# Patient Record
Sex: FEMALE | Race: WHITE | Employment: OTHER | URBAN - METROPOLITAN AREA
[De-identification: names, ages, dates, MRNs, and addresses within clinical notes are randomized per-mention and may not be internally consistent; named-entity substitution may affect disease eponyms.]

---

## 2017-11-07 ENCOUNTER — OP HISTORICAL/CONVERTED ENCOUNTER (OUTPATIENT)
Dept: OTHER | Age: 82
End: 2017-11-07

## 2020-06-14 ENCOUNTER — HOSPITAL ENCOUNTER (INPATIENT)
Age: 85
LOS: 2 days | Discharge: HOME | DRG: 244 | End: 2020-06-16
Attending: EMERGENCY MEDICINE | Admitting: INTERNAL MEDICINE
Payer: MEDICARE

## 2020-06-14 DIAGNOSIS — I44.2 COMPLETE HEART BLOCK (HCC): Primary | ICD-10-CM

## 2020-06-14 DIAGNOSIS — R00.1 SYMPTOMATIC BRADYCARDIA: ICD-10-CM

## 2020-06-14 LAB
ALBUMIN SERPL-MCNC: 3.1 G/DL (ref 3.5–5)
ALBUMIN/GLOB SERPL: 0.6 {RATIO} (ref 1.1–2.2)
ALP SERPL-CCNC: 84 U/L (ref 45–117)
ALT SERPL-CCNC: 15 U/L (ref 12–78)
ANION GAP SERPL CALC-SCNC: 7 MMOL/L (ref 5–15)
AST SERPL-CCNC: 16 U/L (ref 15–37)
ATRIAL RATE: 41 BPM
BASOPHILS # BLD: 0.1 K/UL (ref 0–0.1)
BASOPHILS NFR BLD: 1 % (ref 0–1)
BILIRUB SERPL-MCNC: 0.3 MG/DL (ref 0.2–1)
BUN SERPL-MCNC: 19 MG/DL (ref 6–20)
BUN/CREAT SERPL: 21 (ref 12–20)
CALCIUM SERPL-MCNC: 9.1 MG/DL (ref 8.5–10.1)
CALCULATED R AXIS, ECG10: 95 DEGREES
CALCULATED T AXIS, ECG11: 43 DEGREES
CHLORIDE SERPL-SCNC: 102 MMOL/L (ref 97–108)
CO2 SERPL-SCNC: 24 MMOL/L (ref 21–32)
COMMENT, HOLDF: NORMAL
CREAT SERPL-MCNC: 0.89 MG/DL (ref 0.55–1.02)
DIAGNOSIS, 93000: NORMAL
DIFFERENTIAL METHOD BLD: ABNORMAL
EOSINOPHIL # BLD: 0.3 K/UL (ref 0–0.4)
EOSINOPHIL NFR BLD: 3 % (ref 0–7)
ERYTHROCYTE [DISTWIDTH] IN BLOOD BY AUTOMATED COUNT: 13.4 % (ref 11.5–14.5)
GLOBULIN SER CALC-MCNC: 5.1 G/DL (ref 2–4)
GLUCOSE SERPL-MCNC: 169 MG/DL (ref 65–100)
HCT VFR BLD AUTO: 36.5 % (ref 35–47)
HGB BLD-MCNC: 12.1 G/DL (ref 11.5–16)
IMM GRANULOCYTES # BLD AUTO: 0 K/UL (ref 0–0.04)
IMM GRANULOCYTES NFR BLD AUTO: 0 % (ref 0–0.5)
LYMPHOCYTES # BLD: 1.9 K/UL (ref 0.8–3.5)
LYMPHOCYTES NFR BLD: 19 % (ref 12–49)
MAGNESIUM SERPL-MCNC: 1.9 MG/DL (ref 1.6–2.4)
MCH RBC QN AUTO: 32.6 PG (ref 26–34)
MCHC RBC AUTO-ENTMCNC: 33.2 G/DL (ref 30–36.5)
MCV RBC AUTO: 98.4 FL (ref 80–99)
MONOCYTES # BLD: 0.6 K/UL (ref 0–1)
MONOCYTES NFR BLD: 6 % (ref 5–13)
NEUTS SEG # BLD: 6.9 K/UL (ref 1.8–8)
NEUTS SEG NFR BLD: 71 % (ref 32–75)
NRBC # BLD: 0 K/UL (ref 0–0.01)
NRBC BLD-RTO: 0 PER 100 WBC
PLATELET # BLD AUTO: 253 K/UL (ref 150–400)
PMV BLD AUTO: 9.6 FL (ref 8.9–12.9)
POTASSIUM SERPL-SCNC: 4.1 MMOL/L (ref 3.5–5.1)
PROT SERPL-MCNC: 8.2 G/DL (ref 6.4–8.2)
Q-T INTERVAL, ECG07: 484 MS
QRS DURATION, ECG06: 102 MS
QTC CALCULATION (BEZET), ECG08: 394 MS
RBC # BLD AUTO: 3.71 M/UL (ref 3.8–5.2)
SAMPLES BEING HELD,HOLD: NORMAL
SODIUM SERPL-SCNC: 133 MMOL/L (ref 136–145)
TROPONIN I SERPL-MCNC: <0.05 NG/ML
TSH SERPL DL<=0.05 MIU/L-ACNC: 0.55 UIU/ML (ref 0.36–3.74)
VENTRICULAR RATE, ECG03: 40 BPM
WBC # BLD AUTO: 9.7 K/UL (ref 3.6–11)

## 2020-06-14 PROCEDURE — 65620000000 HC RM CCU GENERAL

## 2020-06-14 PROCEDURE — 93005 ELECTROCARDIOGRAM TRACING: CPT

## 2020-06-14 PROCEDURE — 84484 ASSAY OF TROPONIN QUANT: CPT

## 2020-06-14 PROCEDURE — 99284 EMERGENCY DEPT VISIT MOD MDM: CPT

## 2020-06-14 PROCEDURE — 85025 COMPLETE CBC W/AUTO DIFF WBC: CPT

## 2020-06-14 PROCEDURE — 36415 COLL VENOUS BLD VENIPUNCTURE: CPT

## 2020-06-14 PROCEDURE — 74011250637 HC RX REV CODE- 250/637: Performed by: INTERNAL MEDICINE

## 2020-06-14 PROCEDURE — 83735 ASSAY OF MAGNESIUM: CPT

## 2020-06-14 PROCEDURE — 80053 COMPREHEN METABOLIC PANEL: CPT

## 2020-06-14 PROCEDURE — 94760 N-INVAS EAR/PLS OXIMETRY 1: CPT

## 2020-06-14 PROCEDURE — 84443 ASSAY THYROID STIM HORMONE: CPT

## 2020-06-14 RX ORDER — ASPIRIN 81 MG/1
81 TABLET ORAL DAILY
Status: DISCONTINUED | OUTPATIENT
Start: 2020-06-15 | End: 2020-06-16 | Stop reason: HOSPADM

## 2020-06-14 RX ORDER — METFORMIN HYDROCHLORIDE 500 MG/1
500 TABLET ORAL 2 TIMES DAILY WITH MEALS
Status: DISCONTINUED | OUTPATIENT
Start: 2020-06-14 | End: 2020-06-14

## 2020-06-14 RX ORDER — FLUTICASONE PROPIONATE 50 MCG
2 SPRAY, SUSPENSION (ML) NASAL
COMMUNITY

## 2020-06-14 RX ORDER — CETIRIZINE HCL 10 MG
10 TABLET ORAL
COMMUNITY

## 2020-06-14 RX ORDER — NALOXONE HYDROCHLORIDE 0.4 MG/ML
0.4 INJECTION, SOLUTION INTRAMUSCULAR; INTRAVENOUS; SUBCUTANEOUS AS NEEDED
Status: DISCONTINUED | OUTPATIENT
Start: 2020-06-14 | End: 2020-06-16 | Stop reason: HOSPADM

## 2020-06-14 RX ORDER — ACETAMINOPHEN 325 MG/1
650 TABLET ORAL
Status: DISCONTINUED | OUTPATIENT
Start: 2020-06-14 | End: 2020-06-16 | Stop reason: HOSPADM

## 2020-06-14 RX ORDER — ALLOPURINOL 100 MG/1
300 TABLET ORAL DAILY
Status: DISCONTINUED | OUTPATIENT
Start: 2020-06-15 | End: 2020-06-16 | Stop reason: HOSPADM

## 2020-06-14 RX ORDER — FLUTICASONE PROPIONATE 50 MCG
2 SPRAY, SUSPENSION (ML) NASAL
Status: DISCONTINUED | OUTPATIENT
Start: 2020-06-14 | End: 2020-06-16 | Stop reason: HOSPADM

## 2020-06-14 RX ORDER — CETIRIZINE HCL 10 MG
10 TABLET ORAL DAILY
Status: DISCONTINUED | OUTPATIENT
Start: 2020-06-15 | End: 2020-06-16 | Stop reason: HOSPADM

## 2020-06-14 RX ORDER — HYDROCHLOROTHIAZIDE 25 MG/1
12.5 TABLET ORAL DAILY
Status: DISCONTINUED | OUTPATIENT
Start: 2020-06-15 | End: 2020-06-16 | Stop reason: HOSPADM

## 2020-06-14 RX ORDER — LOSARTAN POTASSIUM 50 MG/1
100 TABLET ORAL DAILY
Status: DISCONTINUED | OUTPATIENT
Start: 2020-06-15 | End: 2020-06-16 | Stop reason: HOSPADM

## 2020-06-14 RX ORDER — SODIUM CHLORIDE 0.9 % (FLUSH) 0.9 %
5-40 SYRINGE (ML) INJECTION EVERY 8 HOURS
Status: DISCONTINUED | OUTPATIENT
Start: 2020-06-14 | End: 2020-06-16 | Stop reason: HOSPADM

## 2020-06-14 RX ORDER — MONTELUKAST SODIUM 10 MG/1
10 TABLET ORAL
COMMUNITY

## 2020-06-14 RX ORDER — AMLODIPINE BESYLATE 5 MG/1
10 TABLET ORAL DAILY
Status: DISCONTINUED | OUTPATIENT
Start: 2020-06-15 | End: 2020-06-16 | Stop reason: HOSPADM

## 2020-06-14 RX ORDER — METFORMIN HYDROCHLORIDE 500 MG/1
500 TABLET ORAL
Status: DISCONTINUED | OUTPATIENT
Start: 2020-06-15 | End: 2020-06-16 | Stop reason: HOSPADM

## 2020-06-14 RX ORDER — SODIUM CHLORIDE 0.9 % (FLUSH) 0.9 %
5-40 SYRINGE (ML) INJECTION AS NEEDED
Status: DISCONTINUED | OUTPATIENT
Start: 2020-06-14 | End: 2020-06-16 | Stop reason: HOSPADM

## 2020-06-14 RX ORDER — MONTELUKAST SODIUM 10 MG/1
10 TABLET ORAL
Status: DISCONTINUED | OUTPATIENT
Start: 2020-06-14 | End: 2020-06-16 | Stop reason: HOSPADM

## 2020-06-14 RX ORDER — DOCUSATE SODIUM 100 MG/1
100 CAPSULE, LIQUID FILLED ORAL 2 TIMES DAILY
Status: DISCONTINUED | OUTPATIENT
Start: 2020-06-14 | End: 2020-06-16 | Stop reason: HOSPADM

## 2020-06-14 RX ADMIN — MONTELUKAST SODIUM 10 MG: 10 TABLET, FILM COATED ORAL at 21:43

## 2020-06-14 RX ADMIN — Medication 10 ML: at 21:30

## 2020-06-14 NOTE — PROGRESS NOTES
1520 TRANSFER - IN REPORT:Verbal report received from Fanta(name) on [place]  being received from ED(unit) for routine progression of care  Report consisted of patients Situation, Background, Assessment and Recommendations(SBAR). Information from the following report(s) SBAR, Intake/Output, MAR and Cardiac Rhythm 3rd Degree HB with Junctional escape beats was reviewed with the receiving nurse. Opportunity for questions and clarification was provided. Assessment completed upon patients arrival to unit and care assumed. 1536 Pt arrived to CCU. Primary Nurse Abelina Claude, RN and Hamlet Burciaga RN performed a dual skin assessment on this patient No impairment noted Jam score is 18 Patient resting on [place] In touch bed.    1930 Bedside shift change report given to Kalen (oncoming nurse) by Gwen Peterson (offgoing nurse). Report included the following information SBAR, Intake/Output, MAR, Recent Results and Cardiac Rhythm Complete heart block.

## 2020-06-14 NOTE — ROUTINE PROCESS
1930: Bedside shift change report given to Kalen RN (oncoming nurse) by Denis Lamb RN (offgoing nurse). Report included the following information SBAR, Kardex, ED Summary, Intake/Output, MAR, Recent Results and Cardiac Rhythm SB c CHB. 2030: Dr Santos Factor called CCU and verified with RN plan for PPM insertion tomorrow morning and for pt to remain NPO after midnight. 2100: RN at the bedside to get pt off bedpan and provide full CHG bath. 0730:

## 2020-06-14 NOTE — Clinical Note
Dressed using non-adherent, topical skin adhesive and transparent dressing. Site: clean, dry, & intact, no bleeding and no hematoma.

## 2020-06-14 NOTE — Clinical Note
TRANSFER - OUT REPORT:     Verbal report given to: Jacklyn. Report consisted of patient's Situation, Background, Assessment and   Recommendations(SBAR). Opportunity for questions and clarification was provided. Patient transported with a Registered Nurse and [address] [place] [address] / [facility] [employer].

## 2020-06-14 NOTE — ED TRIAGE NOTES
Patient arrives complaining of generalized weakness and dizziness starting when she woke up this morning. HR 38-40 in triage, reports taking metoprolol this morning, but denies taking afternoon dose.

## 2020-06-14 NOTE — PROGRESS NOTES
Admission Medication Reconciliation:    Information obtained from:  Patient via secure line  RxQuery data available¹:  NO    Comments/Recommendations: Updated PTA meds/reviewed patient's allergies. 1)  Patient is a good historian who was able to provide medications, regimens and last doses. She reports recent nagging cough which her pulmonologist placed her on cetirizine, montelukast, and PRN Flonase    2)  Medication changes (since last review): Added  - Cetirizine  - Montelukast  - Flonase PRN    Adjusted  - Metformin 500 mg daily with Breakfast  - Metoprolol tartrate 50 mg TID (vs. 75 mg Twice daily)    Removed  - Doxazosin 1 mg QHS    3)  No new undocumented allergies. ¹RxQuery pharmacy benefit data reflects medications filled and processed through the patient's insurance, however   this data does NOT capture whether the medication was picked up or is currently being taken by the patient. Allergies:  Rocephin [ceftriaxone] and Sulfa (sulfonamide antibiotics)    Significant PMH/Disease States:   Past Medical History:   Diagnosis Date    Cancer of breast ([facility]     Goiter     HTN (hypertension)      Chief Complaint for this Admission:    Chief Complaint   Patient presents with    Fatigue    Dizziness     Prior to Admission Medications:   Prior to Admission Medications   Prescriptions Last Dose Informant Taking? CALCIUM CARBONATE/VITAMIN D3 (CALTRATE 600 + D PO) 2020 at AM  Yes   Sig: Take  by mouth two (2) times a day. allopurinol (ZYLOPRIM) 300 mg tablet 2020 at AM  Yes   Sig: Take  by mouth daily. amlodipine (NORVASC) 10 mg tablet 2020 at AM  Yes   Sig: Take  by mouth daily. aspirin 81 mg tablet 2020 at AM  Yes   Sig: Take 81 mg by mouth. cetirizine (ZyrTEC) 10 mg tablet 2020 at AM  Yes   Sig: Take 10 mg by mouth.    fluticasone propionate (Flonase Allergy Relief) 50 mcg/actuation nasal spray 2020 at AM  Yes   Si Sprays by Both Nostrils route two (2) times daily as needed for Rhinitis. metformin (GLUCOPHAGE) 500 mg tablet 6/14/2020 at AM  Yes   Sig: Take  by mouth daily. metoprolol (LOPRESSOR) 50 mg tablet 6/14/2020 at AM  Yes   Sig: Take 50 mg by mouth three (3) times daily (with meals). 1 and 1/2 tabs    montelukast (Singulair) 10 mg tablet 6/13/2020 at HS  Yes   Sig: Take 10 mg by mouth nightly. olmesartan-hydrochlorothiazide (BENICAR HCT) 40-12.5 mg per tablet 6/14/2020 at AM  Yes   Sig: Take 1 Tab by mouth daily. omeprazole (PRILOSEC) 20 mg capsule 6/14/2020 at AM  Yes   Sig: Take 20 mg by mouth daily. Facility-Administered Medications: None       Please contact the main inpatient pharmacy with any questions or concerns at [phone] and we will direct you to the clinical pharmacist covering this patient's care while in-house.    Nadia Mercer, PHARMD

## 2020-06-14 NOTE — ED PROVIDER NOTES
Fatigue   This is a new problem. The current episode started 6 to 12 hours ago. The problem has not changed since onset. There was no focality noted. Pertinent negatives include no focal weakness, no slurred speech, no memory loss, no mental status change and no disorientation. There has been no fever. Pertinent negatives include no shortness of breath, no chest pain, no vomiting, no altered mental status and no confusion. There were no medications administered prior to arrival. Associated medical issues do not include CVA. Dizziness   This is a new problem. The current episode started 6 to 12 hours ago. The problem has not changed since onset. There was no focality noted. Pertinent negatives include no focal weakness, no slurred speech, no memory loss, no mental status change and no disorientation. There has been no fever. Pertinent negatives include no shortness of breath, no chest pain, no vomiting, no altered mental status and no confusion. Associated medical issues do not include CVA. Past Medical History:   Diagnosis Date    Cancer [facility] breast ([facility] [address]     Goiter     HTN (hypertension)        Past Surgical History:   Procedure Laterality Date    ENLARGE BREAST WITH IMPLANT      HX HYSTERECTOMY      MASTECTOMY FORM  1977         Family History:   Problem Relation Age of Onset    Cancer Father     Cancer Brother        Social History     Socioeconomic History    Marital status:      Spouse name: Not on file    Number of children: Not on file    Years of education: Not on file    Highest education level: Not on file   Occupational History    Not on file   Social Needs    Financial resource strain: Not on file    Food insecurity     Worry: Not on file     Inability: Not on file    Transportation needs     Medical: Not on file     Non-medical: Not on file   Tobacco Use    Smoking status: Never Smoker   Substance and Sexual Activity    Alcohol use:  Yes     Alcohol/week: 0.0 standard drinks     Types: 1 - 2 Glasses of wine per week     Comment: daily    Drug use: No    Sexual activity: Not on file   Lifestyle    Physical activity     Days per week: Not on file     Minutes per session: Not on file    Stress: Not on file   Relationships    Social connections     Talks on phone: Not on file     Gets together: Not on file     Attends Gnosticist service: Not on file     Active member of club or organization: Not on file     Attends meetings of clubs or organizations: Not on file     Relationship status: Not on file    Intimate partner violence     Fear of current or ex partner: Not on file     Emotionally abused: Not on file     Physically abused: Not on file     Forced sexual activity: Not on file   Other Topics Concern    Not on file   Social History Narrative    Not on file         ALLERGIES: Rocephin [ceftriaxone] and Sulfa (sulfonamide antibiotics)    Review of Systems   Constitutional: Positive for fatigue. Respiratory: Negative for shortness of breath. Cardiovascular: Negative for chest pain. Gastrointestinal: Negative for vomiting. Neurological: Positive for dizziness. Negative for focal weakness. Psychiatric/Behavioral: Negative for confusion and memory loss. All other systems reviewed and are negative. Vitals:    06/14/20 1348   BP: 168/57   Pulse: (!) 40   Resp: 18   Temp: 97.7 °F (36.5 °C)   SpO2: 96%   Height: 5' 5\" (1.651 m)            Physical Exam  Vitals signs and nursing note reviewed. Constitutional:       General: She is not in acute distress. Appearance: She is well-developed. HENT:      Head: Normocephalic and atraumatic. Eyes:      Conjunctiva/sclera: Conjunctivae normal.   Neck:      Musculoskeletal: Neck supple. Cardiovascular:      Rate and Rhythm: Regular rhythm. Bradycardia present. Heart sounds: Normal heart sounds. Pulmonary:      Effort: Pulmonary effort is normal. No respiratory distress.       Breath sounds: Normal breath sounds. Abdominal:      General: There is no distension. Musculoskeletal: Normal range of motion. General: No deformity. Skin:     General: Skin is warm and dry. Neurological:      Mental Status: She is alert. Cranial Nerves: No cranial nerve deficit. Psychiatric:         Behavior: Behavior normal.          MDM     80 y.o. female presents with severe onset fatigue and malaise that started this morning. She has been otherwise normal leading up to today. She arrives in complete heart block with junctional escape which is symptomatic but no signs of hemodynamic compromise. Discussed with cardiology who is coming to admit patient and monitor with plan for urgent pacemaker. Procedures    Critical Care Time: 31 minutes  I personally performed critical care time separate of billable procedures which may include ordering and interpretation of testing, ordering of medications, direct patient assessment and reassessment, discussion with consultants or family, and documentation. EKG 1355: Rate 40, 3rd degree heart block with junctional escape. No ST segment or T wave abnormalities. No previous tracings available for review.

## 2020-06-14 NOTE — H&P
CARDIOLOGY ADMIT                 Subjective:    Date of  Admission: 6/14/2020  1:54 PM     Admission type:Emergency    Halina Hurst is a 80 y.o. female admitted for Heart block AV complete ([facility] [I44.2]. She awoke this AM and knew something was off. She has been on stable meds for some time and has only had a recent issue with a nagging cough which is being evaluated by pulmonary. She felt poorly and weak. There were no syncopal episodes or near-syncopal episodes. Upon arriving, she was noted to be markedly bradycardic. EKG showed that she was in complete heart block.     Patient Active Problem List    Diagnosis Date Noted    Heart block AV complete ([facility] [address] 06/14/2020    Thyroid nodule 08/19/2011    HTN (hypertension)     Pamela Hayes MD  Past Medical History:   Diagnosis Date    Cancer of breast ([facility]     Goiter     HTN (hypertension)       Past Surgical History:   Procedure Laterality Date    ENLARGE BREAST WITH IMPLANT      HX HYSTERECTOMY      MASTECTOMY FORM  1977     Allergies   Allergen Reactions    Rocephin [Ceftriaxone] Other (comments)     Passed out    Sulfa (Sulfonamide Antibiotics) Swelling     Throat closing      Family History   Problem Relation Age of Onset    Cancer Father     Cancer Brother       Current Facility-Administered Medications   Medication Dose Route Frequency    sodium chloride (NS) flush 5-40 mL  5-40 mL IntraVENous Q8H    sodium chloride (NS) flush 5-40 mL  5-40 mL IntraVENous PRN    acetaminophen (TYLENOL) tablet 650 mg  650 mg Oral Q4H PRN    naloxone (NARCAN) injection 0.4 mg  0.4 mg IntraVENous PRN    docusate sodium (COLACE) capsule 100 mg  100 mg Oral BID    [START ON 6/15/2020] allopurinoL (ZYLOPRIM) tablet 300 mg  300 mg Oral DAILY    [START ON 6/15/2020] amLODIPine (NORVASC) tablet 10 mg  10 mg Oral DAILY    [START ON 6/15/2020] aspirin tablet 81 mg  81 mg Oral DAILY    metFORMIN (GLUCOPHAGE) tablet 500 mg  500 mg Oral BID WITH MEALS    [facility] TO SUBSTITUTE PER PROTOCOL (Reordered from: olmesartan-hydrochlorothiazide (BENICAR HCT) 40-12.5 mg per tablet)    Per Protocol     Current Outpatient Medications   Medication Sig    metoprolol (LOPRESSOR) 50 mg tablet Take  by mouth two (2) times a day. 1 and 1/2 tabs     CALCIUM CARBONATE/VITAMIN D3 (CALTRATE 600 + D PO) Take  by mouth two (2) times a day.  metformin (GLUCOPHAGE) 500 mg tablet Take  by mouth daily.  aspirin 81 mg tablet Take 81 mg by mouth.  doxazosin (CARDURA) 1 mg tablet Take  by mouth nightly.  omeprazole (PRILOSEC) 20 mg capsule Take 20 mg by mouth daily.  allopurinol (ZYLOPRIM) 300 mg tablet Take  by mouth daily.  olmesartan-hydrochlorothiazide (BENICAR HCT) 40-12.5 mg per tablet Take 1 Tab by mouth daily.  amlodipine (NORVASC) 10 mg tablet Take  by mouth daily. Review of Symptoms:  Gen - no F/C/S  Eyes - no vision changes  ENT - no sore throat, rhinorrhea, otalgia  CV - no CP, no palpitations, no orthopnea, no PND, no TOYA  Resp no cough, no SOB/CASTELLANO  GI - no AP, no n/v/d/c   - no dysuria, no hematuria  MSK - no abnormal joint pains  Skin - no rashes  Neuro - no HA, no numbness, no weakness, no slurred speech  Psych - no change in mood         Physical Exam    Visit Vitals  /57 (BP 1 Location: Right arm, BP Patient Position: At rest)   Pulse (!) 40   Temp 97.7 °F (36.5 °C)   Resp 18   Ht 5' 5\" (1.651 m)   Wt 79.1 kg (174 lb 6.4 oz)   SpO2 96%   BMI 29.02 kg/m²     NAD  Skin warm and dry  Nl conjunctiva  Oropharynx without exudate.     Neck supple  Lungs clear  Bradycardic, regular  Abdomen soft and non tender  Pulses 2+ radials  No TOYA  Neuro:  Grossly intact  Appropriate      Cardiographics    Telemetry: Heart block  ECG: heart block , no other ischemic changes  Echocardiogram: pending    Labs:   Recent Results (from the past 24 hour(s))   EKG, 12 LEAD, INITIAL    Collection Time: 06/14/20  1:55 PM   Result Value Ref Range    Ventricular Rate 40 BPM    Atrial Rate 41 BPM    QRS Duration 102 ms    Q-T Interval 484 ms    QTC Calculation (Bezet) 394 ms    Calculated R Axis 95 degrees    Calculated T Axis 43 degrees    Diagnosis       Marked sinus bradycardia with AV dissociation and Junctional bradycardia with   sinus/atrial capture  Septal infarct , age undetermined  ST & T wave abnormality, consider anterior ischemia  No previous ECGs available     CBC WITH AUTOMATED DIFF    Collection Time: 06/14/20  1:58 PM   Result Value Ref Range    WBC 9.7 3.6 - 11.0 K/uL    RBC 3.71 (L) 3.80 - 5.20 M/uL    HGB 12.1 11.5 - 16.0 g/dL    HCT 36.5 35.0 - 47.0 %    MCV 98.4 80.0 - 99.0 FL    MCH 32.6 26.0 - 34.0 PG    MCHC 33.2 30.0 - 36.5 g/dL    RDW 13.4 11.5 - 14.5 %    PLATELET 557 011 - 131 K/uL    MPV 9.6 8.9 - 12.9 FL    NRBC 0.0 0  WBC    ABSOLUTE NRBC 0.00 0.00 - 0.01 K/uL    NEUTROPHILS 71 32 - 75 %    LYMPHOCYTES 19 12 - 49 %    MONOCYTES 6 5 - 13 %    EOSINOPHILS 3 0 - 7 %    BASOPHILS 1 0 - 1 %    IMMATURE GRANULOCYTES 0 0.0 - 0.5 %    ABS. NEUTROPHILS 6.9 1.8 - 8.0 K/UL    ABS. LYMPHOCYTES 1.9 0.8 - 3.5 K/UL    ABS. MONOCYTES 0.6 0.0 - 1.0 K/UL    ABS. EOSINOPHILS 0.3 0.0 - 0.4 K/UL    ABS. BASOPHILS 0.1 0.0 - 0.1 K/UL    ABS. IMM. GRANS. 0.0 0.00 - 0.04 K/UL    DF AUTOMATED     SAMPLES BEING HELD    Collection Time: 06/14/20  1:58 PM   Result Value Ref Range    SAMPLES BEING HELD [id number]     COMMENT        Add-on orders for these samples will be processed based on acceptable specimen integrity and analyte stability, which may vary by analyte. Assessment and Plan: This is an 80 yof with HTN here with complete heart block. Given that she is hemodynamically stable, she does not have an emergent indication for a PPM or a temporary pacemaker at this time.  However, unless holding her metoprolol is sufficient, she will need a PPM placed tomorrow     Will admit to the ICU for monitoring in event that she deteriorates and needs a temporary device or drip  Holding BB  Holding cardura   Cont other cardiac meds  NPO at MN for PPM placement    Please call with questions    Critical care time 15-49 minutes     Assessment:

## 2020-06-14 NOTE — ROUTINE PROCESS
TRANSFER - OUT REPORT: 
 
Verbal report given to [facility] RN(name) on [place]  being transferred to CCU(unit) for routine progression of care Report consisted of patients Situation, Background, Assessment and  
Recommendations(SBAR). Information from the following report(s) SBAR, Kardex, Recent Results and Cardiac Rhythm 3rd Degree AV Block was reviewed with the receiving nurse. Lines:  
Peripheral IV 06/14/20 Right Wrist (Active) Site Assessment Clean, dry, & intact 6/14/2020  2:15 PM  
Phlebitis Assessment 0 6/14/2020  2:15 PM  
Infiltration Assessment 0 6/14/2020  2:15 PM  
Dressing Status Clean, dry, & intact 6/14/2020  2:15 PM  
  
 
Opportunity for questions and clarification was provided. Patient transported with: 
 Monitor Registered Nurse

## 2020-06-15 ENCOUNTER — APPOINTMENT (OUTPATIENT)
Dept: GENERAL RADIOLOGY | Age: 85
DRG: 244 | End: 2020-06-15
Attending: INTERNAL MEDICINE
Payer: MEDICARE

## 2020-06-15 ENCOUNTER — APPOINTMENT (OUTPATIENT)
Dept: NON INVASIVE DIAGNOSTICS | Age: 85
DRG: 244 | End: 2020-06-15
Attending: INTERNAL MEDICINE
Payer: MEDICARE

## 2020-06-15 LAB
ANION GAP SERPL CALC-SCNC: 7 MMOL/L (ref 5–15)
ATRIAL RATE: 38 BPM
BUN SERPL-MCNC: 16 MG/DL (ref 6–20)
BUN/CREAT SERPL: 20 (ref 12–20)
CALCIUM SERPL-MCNC: 8.9 MG/DL (ref 8.5–10.1)
CALCULATED R AXIS, ECG10: 89 DEGREES
CALCULATED T AXIS, ECG11: 47 DEGREES
CHLORIDE SERPL-SCNC: 102 MMOL/L (ref 97–108)
CO2 SERPL-SCNC: 27 MMOL/L (ref 21–32)
CREAT SERPL-MCNC: 0.79 MG/DL (ref 0.55–1.02)
DIAGNOSIS, 93000: NORMAL
ECHO AO ROOT DIAM: 3.15 CM
ECHO LA MAJOR AXIS: 3.27 CM
ECHO LA TO AORTIC ROOT RATIO: 1.04
ECHO LV INTERNAL DIMENSION DIASTOLIC: 3.86 CM (ref 3.9–5.3)
ECHO LV INTERNAL DIMENSION SYSTOLIC: 2.34 CM
ECHO LV IVSD: 1.19 CM (ref 0.6–0.9)
ECHO LV MASS 2D: 155.1 G (ref 67–162)
ECHO LV MASS INDEX 2D: 90.3 G/M2 (ref 43–95)
ECHO LV POSTERIOR WALL DIASTOLIC: 0.98 CM (ref 0.6–0.9)
ECHO PV MAX VELOCITY: 88.29 CM/S
ECHO PV PEAK GRADIENT: 3.1 MMHG
ECHO TV REGURGITANT MAX VELOCITY: 334.58 CM/S
ECHO TV REGURGITANT PEAK GRADIENT: 44.8 MMHG
GLUCOSE SERPL-MCNC: 96 MG/DL (ref 65–100)
LVFS 2D: 39.36 %
POTASSIUM SERPL-SCNC: 3.8 MMOL/L (ref 3.5–5.1)
Q-T INTERVAL, ECG07: 502 MS
QRS DURATION, ECG06: 110 MS
QTC CALCULATION (BEZET), ECG08: 394 MS
SODIUM SERPL-SCNC: 136 MMOL/L (ref 136–145)
VENTRICULAR RATE, ECG03: 37 BPM

## 2020-06-15 PROCEDURE — C1898 LEAD, PMKR, OTHER THAN TRANS: HCPCS | Performed by: INTERNAL MEDICINE

## 2020-06-15 PROCEDURE — 99152 MOD SED SAME PHYS/QHP 5/>YRS: CPT | Performed by: INTERNAL MEDICINE

## 2020-06-15 PROCEDURE — 74011000250 HC RX REV CODE- 250: Performed by: INTERNAL MEDICINE

## 2020-06-15 PROCEDURE — 36415 COLL VENOUS BLD VENIPUNCTURE: CPT

## 2020-06-15 PROCEDURE — 65620000000 HC RM CCU GENERAL

## 2020-06-15 PROCEDURE — 74011250637 HC RX REV CODE- 250/637: Performed by: INTERNAL MEDICINE

## 2020-06-15 PROCEDURE — 02HK3JZ INSERTION OF PACEMAKER LEAD INTO RIGHT VENTRICLE, PERCUTANEOUS APPROACH: ICD-10-PCS | Performed by: INTERNAL MEDICINE

## 2020-06-15 PROCEDURE — C1893 INTRO/SHEATH, FIXED,NON-PEEL: HCPCS | Performed by: INTERNAL MEDICINE

## 2020-06-15 PROCEDURE — 77030039046 HC PAD DEFIB RADIOTRNSPNT CNMD -B: Performed by: INTERNAL MEDICINE

## 2020-06-15 PROCEDURE — 74011250636 HC RX REV CODE- 250/636: Performed by: INTERNAL MEDICINE

## 2020-06-15 PROCEDURE — 93306 TTE W/DOPPLER COMPLETE: CPT

## 2020-06-15 PROCEDURE — 99153 MOD SED SAME PHYS/QHP EA: CPT | Performed by: INTERNAL MEDICINE

## 2020-06-15 PROCEDURE — 77030012935 HC DRSG AQUACEL BMS -B: Performed by: INTERNAL MEDICINE

## 2020-06-15 PROCEDURE — 77030038269 HC DRN EXT URIN PURWCK BARD -A

## 2020-06-15 PROCEDURE — 77030022704 HC SUT VLOC COVD -B: Performed by: INTERNAL MEDICINE

## 2020-06-15 PROCEDURE — C1892 INTRO/SHEATH,FIXED,PEEL-AWAY: HCPCS | Performed by: INTERNAL MEDICINE

## 2020-06-15 PROCEDURE — 71045 X-RAY EXAM CHEST 1 VIEW: CPT

## 2020-06-15 PROCEDURE — C1769 GUIDE WIRE: HCPCS | Performed by: INTERNAL MEDICINE

## 2020-06-15 PROCEDURE — C1785 PMKR, DUAL, RATE-RESP: HCPCS | Performed by: INTERNAL MEDICINE

## 2020-06-15 PROCEDURE — 77030002996 HC SUT SLK J&J -A: Performed by: INTERNAL MEDICINE

## 2020-06-15 PROCEDURE — A4565 SLINGS: HCPCS | Performed by: INTERNAL MEDICINE

## 2020-06-15 PROCEDURE — 33208 INSRT HEART PM ATRIAL & VENT: CPT | Performed by: INTERNAL MEDICINE

## 2020-06-15 PROCEDURE — 74011636320 HC RX REV CODE- 636/320: Performed by: INTERNAL MEDICINE

## 2020-06-15 PROCEDURE — 02H63JZ INSERTION OF PACEMAKER LEAD INTO RIGHT ATRIUM, PERCUTANEOUS APPROACH: ICD-10-PCS | Performed by: INTERNAL MEDICINE

## 2020-06-15 PROCEDURE — C1887 CATHETER, GUIDING: HCPCS | Performed by: INTERNAL MEDICINE

## 2020-06-15 PROCEDURE — 80048 BASIC METABOLIC PNL TOTAL CA: CPT

## 2020-06-15 PROCEDURE — 0JH606Z INSERTION OF PACEMAKER, DUAL CHAMBER INTO CHEST SUBCUTANEOUS TISSUE AND FASCIA, OPEN APPROACH: ICD-10-PCS | Performed by: INTERNAL MEDICINE

## 2020-06-15 PROCEDURE — 77030010507 HC ADH SKN DERMBND J&J -B: Performed by: INTERNAL MEDICINE

## 2020-06-15 PROCEDURE — 77030018673: Performed by: INTERNAL MEDICINE

## 2020-06-15 DEVICE — LEAD PCMKR CAPSUR SP NOVUS 58 --: Type: IMPLANTABLE DEVICE | Status: FUNCTIONAL

## 2020-06-15 DEVICE — LEAD PACE 6FR L53CM PLAT ALLOY/POROUS TI NITRIDE PERM R ATR: Type: IMPLANTABLE DEVICE | Status: FUNCTIONAL

## 2020-06-15 DEVICE — IPG W1DR01 AZURE XT DR MRI WL USA BCP
Type: IMPLANTABLE DEVICE | Status: FUNCTIONAL
Brand: AZURE™ XT DR MRI SURESCAN™

## 2020-06-15 RX ORDER — HYDROCODONE BITARTRATE AND ACETAMINOPHEN 5; 325 MG/1; MG/1
1 TABLET ORAL
Status: DISCONTINUED | OUTPATIENT
Start: 2020-06-15 | End: 2020-06-16 | Stop reason: HOSPADM

## 2020-06-15 RX ORDER — MIDAZOLAM HYDROCHLORIDE 1 MG/ML
INJECTION, SOLUTION INTRAMUSCULAR; INTRAVENOUS AS NEEDED
Status: DISCONTINUED | OUTPATIENT
Start: 2020-06-15 | End: 2020-06-15 | Stop reason: HOSPADM

## 2020-06-15 RX ORDER — FENTANYL CITRATE 50 UG/ML
INJECTION, SOLUTION INTRAMUSCULAR; INTRAVENOUS AS NEEDED
Status: DISCONTINUED | OUTPATIENT
Start: 2020-06-15 | End: 2020-06-15 | Stop reason: HOSPADM

## 2020-06-15 RX ORDER — SODIUM CHLORIDE 0.9 % (FLUSH) 0.9 %
5-40 SYRINGE (ML) INJECTION EVERY 8 HOURS
Status: DISCONTINUED | OUTPATIENT
Start: 2020-06-15 | End: 2020-06-16 | Stop reason: HOSPADM

## 2020-06-15 RX ORDER — SODIUM CHLORIDE 0.9 % (FLUSH) 0.9 %
5-40 SYRINGE (ML) INJECTION AS NEEDED
Status: DISCONTINUED | OUTPATIENT
Start: 2020-06-15 | End: 2020-06-16 | Stop reason: HOSPADM

## 2020-06-15 RX ORDER — LIDOCAINE HYDROCHLORIDE 10 MG/ML
INJECTION INFILTRATION; PERINEURAL AS NEEDED
Status: DISCONTINUED | OUTPATIENT
Start: 2020-06-15 | End: 2020-06-15 | Stop reason: HOSPADM

## 2020-06-15 RX ORDER — CEFAZOLIN SODIUM/WATER 2 G/20 ML
SYRINGE (ML) INTRAVENOUS AS NEEDED
Status: DISCONTINUED | OUTPATIENT
Start: 2020-06-15 | End: 2020-06-15 | Stop reason: HOSPADM

## 2020-06-15 RX ADMIN — ACETAMINOPHEN 650 MG: 325 TABLET ORAL at 10:47

## 2020-06-15 RX ADMIN — CETIRIZINE HYDROCHLORIDE 10 MG: 10 TABLET, FILM COATED ORAL at 10:34

## 2020-06-15 RX ADMIN — ACETAMINOPHEN 650 MG: 325 TABLET ORAL at 21:04

## 2020-06-15 RX ADMIN — AMLODIPINE BESYLATE 10 MG: 5 TABLET ORAL at 10:34

## 2020-06-15 RX ADMIN — Medication 10 ML: at 06:31

## 2020-06-15 RX ADMIN — ACETAMINOPHEN 650 MG: 325 TABLET ORAL at 17:27

## 2020-06-15 RX ADMIN — LOSARTAN POTASSIUM 100 MG: 50 TABLET, FILM COATED ORAL at 10:33

## 2020-06-15 RX ADMIN — Medication 10 ML: at 21:03

## 2020-06-15 RX ADMIN — MONTELUKAST SODIUM 10 MG: 10 TABLET, FILM COATED ORAL at 21:03

## 2020-06-15 RX ADMIN — ALLOPURINOL 300 MG: 100 TABLET ORAL at 10:34

## 2020-06-15 RX ADMIN — HYDROCHLOROTHIAZIDE 12.5 MG: 25 TABLET ORAL at 10:33

## 2020-06-15 RX ADMIN — ASPIRIN 81 MG: 81 TABLET, COATED ORAL at 10:34

## 2020-06-15 NOTE — CONSULTS
ELECTROPHYSIOLOGY CONSULT                 Assessment:     Assessment:       Active Problems:    Heart block AV complete ([address] (6/14/2020)         Plan:    1. CHB:  rec pacemaker  RIsks for for Device implant    I have discussed the risk and benefits of device implantation with the patient and family. The risks of pacemaker implant include:  5/1000 neuro vascular injury such as bleeding   3/1000 CVA, MI , or death  2-4% risk of infection  1% risk of pneumothorax  The lifestyle modifications with a device implant such as avoidance of MRI scanners and areas with high TIGRE were discussed. The patient understood and wished to proceed. 2. Hx Breast cancer left marck  Sp resection and ln  Implant pacemker on right. Subjective:    Date of  Admission: 6/14/2020  1:54 PM     Admission type:Emergency    Alfred Groves is a 80 y.o. female admitted for Heart block AV complete ([facility] [I44.2]. Patient complains of  palpitations, near-syncope, fatigue. This consultation was requested by Dr. Sandra Lang . Patient's symptoms last approximately 2 days, and are gradual onset, still present. She describes the symptoms as occurring at rest, occurring onset during, sleep. The symptoms have been associated with nothing and are worsened by standing, walking . Previous treatment/evaluation includes persantine thallium . Cardiac risk factors: none.     Patient Active Problem List    Diagnosis Date Noted    Heart block AV complete ([address] 06/14/2020    Thyroid nodule 08/19/2011    HTN (hypertension)     Goiter       Daryle Piper Bennye Eis, MD  Past Medical History:   Diagnosis Date    Cancer of breast ([facility] [address]     Goiter     HTN (hypertension)       Past Surgical History:   Procedure Laterality Date    ENLARGE BREAST WITH IMPLANT      HX HYSTERECTOMY      MASTECTOMY FORM  1977     Allergies   Allergen Reactions    Rocephin [Ceftriaxone] Other (comments)     Passed out    Sulfa (Sulfonamide Antibiotics) Anaphylaxis     Throat closing      Family History   Problem Relation Age of Onset    Cancer Father     Cancer Brother       Current Facility-Administered Medications   Medication Dose Route Frequency    sodium chloride (NS) flush 5-40 mL  5-40 mL IntraVENous Q8H    sodium chloride (NS) flush 5-40 mL  5-40 mL IntraVENous PRN    acetaminophen (TYLENOL) tablet 650 mg  650 mg Oral Q4H PRN    naloxone (NARCAN) injection 0.4 mg  0.4 mg IntraVENous PRN    docusate sodium (COLACE) capsule 100 mg  100 mg Oral BID    allopurinoL (ZYLOPRIM) tablet 300 mg  300 mg Oral DAILY    amLODIPine (NORVASC) tablet 10 mg  10 mg Oral DAILY    aspirin delayed-release tablet 81 mg  81 mg Oral DAILY    metFORMIN (GLUCOPHAGE) tablet 500 mg  500 mg Oral DAILY WITH BREAKFAST    losartan (COZAAR) tablet 100 mg  100 mg Oral DAILY    And    hydroCHLOROthiazide (HYDRODIURIL) tablet 12.5 mg  12.5 mg Oral DAILY    cetirizine (ZYRTEC) tablet 10 mg  10 mg Oral DAILY    montelukast (SINGULAIR) tablet 10 mg  10 mg Oral QHS    fluticasone propionate (FLONASE) 50 mcg/actuation nasal spray 2 Spray  2 Spray Both Nostrils BID PRN         Review of Symptoms:  A comprehensive review of systems was negative. No hemoptysis, hematemesis, epistaxis, melena, hematuria. No fevers,  Rashes, seizures, visual disturbances, difficulty walking, no abdominal pain         Physical Exam    Visit Vitals  /86   Pulse (!) 39   Temp 97.9 °F (36.6 °C)   Resp 23   Ht 5' 5\" (1.651 m)   Wt 144 lb 2.9 oz (65.4 kg)   SpO2 95%   Breastfeeding No   BMI 23.99 kg/m²     Skin warm and dry  PERRLA, EOMI  Oropharynx without exudate. Mallampati 2  Neck supple, thyroid not enlarged  Lungs clear  PMI non displaced. Normal S1/ S2   No Mummurs, click or Rubs  No S3 or S4  Abdomen soft and non tender, No Hepatosplenomegaly  Pulses 2+ throughout,   Neuro: , normal facial grimace,  Moves all extremities.    AAAO  unanxious      Cardiographics    Telemetry: normal sinus rhythm  chb  ECG: complete heart block ventr escape rhythm  Labs:   Recent Results (from the past 24 hour(s))   EKG, 12 LEAD, INITIAL    Collection Time: 06/14/20  1:55 PM   Result Value Ref Range    Ventricular Rate 40 BPM    Atrial Rate 41 BPM    QRS Duration 102 ms    Q-T Interval 484 ms    QTC Calculation (Bezet) 394 ms    Calculated R Axis 95 degrees    Calculated T Axis 43 degrees    Diagnosis       sinus rhythm  with third degree av block and idioventricular escaped rhythm  No previous ECGs available  Confirmed by Rowena Martinez MD, [facility] ([id number]) on 6/14/2020 3:40:36 PM     CBC WITH AUTOMATED DIFF    Collection Time: 06/14/20  1:58 PM   Result Value Ref Range    WBC 9.7 3.6 - 11.0 K/uL    RBC 3.71 (L) 3.80 - 5.20 M/uL    HGB 12.1 11.5 - 16.0 g/dL    HCT 36.5 35.0 - 47.0 %    MCV 98.4 80.0 - 99.0 FL    MCH 32.6 26.0 - 34.0 PG    MCHC 33.2 30.0 - 36.5 g/dL    RDW 13.4 11.5 - 14.5 %    PLATELET 480 947 - 896 K/uL    MPV 9.6 8.9 - 12.9 FL    NRBC 0.0 0  WBC    ABSOLUTE NRBC 0.00 0.00 - 0.01 K/uL    NEUTROPHILS 71 32 - 75 %    LYMPHOCYTES 19 12 - 49 %    MONOCYTES 6 5 - 13 %    EOSINOPHILS 3 0 - 7 %    BASOPHILS 1 0 - 1 %    IMMATURE GRANULOCYTES 0 0.0 - 0.5 %    ABS. NEUTROPHILS 6.9 1.8 - 8.0 K/UL    ABS. LYMPHOCYTES 1.9 0.8 - 3.5 K/UL    ABS. MONOCYTES 0.6 0.0 - 1.0 K/UL    ABS. EOSINOPHILS 0.3 0.0 - 0.4 K/UL    ABS. BASOPHILS 0.1 0.0 - 0.1 K/UL    ABS. IMM.  GRANS. 0.0 0.00 - 0.04 K/UL    DF AUTOMATED     METABOLIC PANEL, COMPREHENSIVE    Collection Time: 06/14/20  1:58 PM   Result Value Ref Range    Sodium 133 (L) 136 - 145 mmol/L    Potassium 4.1 3.5 - 5.1 mmol/L    Chloride 102 97 - 108 mmol/L    CO2 24 21 - 32 mmol/L    Anion gap 7 5 - 15 mmol/L    Glucose 169 (H) 65 - 100 mg/dL    BUN 19 6 - 20 MG/DL    Creatinine 0.89 0.55 - 1.02 MG/DL    BUN/Creatinine ratio 21 (H) 12 - 20      GFR est AA >60 >60 ml/min/1.73m2    GFR est non-AA >60 >60 ml/min/1.73m2    Calcium 9.1 8.5 - 10.1 MG/DL    Bilirubin, total 0.3 0.2 - 1.0 MG/DL    ALT (SGPT) 15 12 - 78 U/L    AST (SGOT) 16 15 - 37 U/L    Alk. phosphatase 84 45 - 117 U/L    Protein, total 8.2 6.4 - 8.2 g/dL    Albumin 3.1 (L) 3.5 - 5.0 g/dL    Globulin 5.1 (H) 2.0 - 4.0 g/dL    A-G Ratio 0.6 (L) 1.1 - 2.2     SAMPLES BEING HELD    Collection Time: 06/14/20  1:58 PM   Result Value Ref Range    SAMPLES BEING HELD [id number]     COMMENT        Add-on orders for these samples will be processed based on acceptable specimen integrity and analyte stability, which may vary by analyte.    TSH 3RD GENERATION    Collection Time: 06/14/20  1:58 PM   Result Value Ref Range    TSH 0.55 0.36 - 3.74 uIU/mL   MAGNESIUM    Collection Time: 06/14/20  1:58 PM   Result Value Ref Range    Magnesium 1.9 1.6 - 2.4 mg/dL   TROPONIN I    Collection Time: 06/14/20  1:58 PM   Result Value Ref Range    Troponin-I, Qt. <0.05 <0.05 ng/mL   EKG, 12 LEAD, INITIAL    Collection Time: 06/14/20  4:06 PM   Result Value Ref Range    Ventricular Rate 37 BPM    Atrial Rate 38 BPM    QRS Duration 110 ms    Q-T Interval 502 ms    QTC Calculation (Bezet) 394 ms    Calculated R Axis 89 degrees    Calculated T Axis 47 degrees    Diagnosis       Junctional bradycardia  Incomplete right bundle branch block  Septal infarct (cited on or before 14-JUN-2020)  ST & T wave abnormality, consider anterior ischemia  When compared with ECG of 14-JUN-2020 13:55,  Incomplete right bundle branch block is now present     METABOLIC PANEL, BASIC    Collection Time: 06/15/20  4:23 AM   Result Value Ref Range    Sodium 136 136 - 145 mmol/L    Potassium 3.8 3.5 - 5.1 mmol/L    Chloride 102 97 - 108 mmol/L    CO2 27 21 - 32 mmol/L    Anion gap 7 5 - 15 mmol/L    Glucose 96 65 - 100 mg/dL    BUN 16 6 - 20 MG/DL    Creatinine 0.79 0.55 - 1.02 MG/DL    BUN/Creatinine ratio 20 12 - 20      GFR est AA >60 >60 ml/min/1.73m2    GFR est non-AA >60 >60 ml/min/1.73m2    Calcium 8.9 8.5 - 10.1 MG/DL

## 2020-06-15 NOTE — PROGRESS NOTES
Cardiology Progress Note  6/15/2020     Admit Date: 2020  Admit Diagnosis: Heart block AV complete ([facility] [address] [I44.2]  CC: none currently    Assessment:   Active Problems:    Heart block AV complete ([address] (2020)      Plan:     PPM placed without problems  Cont current cardiac meds  Will consider restarting metoprolol  Echo    Subjective:      Gloria Wilson had PPM today    Objective:    Physical Exam:  Overall VSSAF;    Visit Vitals  /86   Pulse (!) 39   Temp 97.9 °F (36.6 °C)   Resp 23   Ht 5' 5\" (1.651 m)   Wt 65.4 kg (144 lb 2.9 oz)   SpO2 95%   Breastfeeding No   BMI 23.99 kg/m²     Temp (24hrs), Av.8 °F (36.6 °C), Min:97.6 °F (36.4 °C), Max:98 °F (36.7 °C)    Patient Vitals for the past 8 hrs:   Pulse   06/15/20 0700 (!) 39   06/15/20 0600 (!) 38   06/15/20 0500 (!) 36   06/15/20 0400 (!) 37   06/15/20 0300 (!) 36    Patient Vitals for the past 8 hrs:   Resp   06/15/20 0700 23   06/15/20 0600 21   06/15/20 0500 20   06/15/20 0400 19   06/15/20 0300 18    Patient Vitals for the past 8 hrs:   BP   06/15/20 0700 135/86   06/15/20 0600 145/42   06/15/20 0500 150/46   06/15/20 0400 115/45   06/15/20 0300 135/46      No intake/output data recorded. General Appearance: Well developed, well nourished, no acute distress. Ears/Nose/Mouth/Throat:   Normal MM; anicteric. JVP: WNL   Resp:   Lungs clear to auscultation bilaterally. Nl resp effort. Cardiovascular:  RRR, S1, S2 normal, no new murmur. No gallop or rub. Abdomen:   Soft, non-tender, bowel sounds are present. Extremities: No edema bilaterally. Skin:  Neuro: Warm and dry.   A/O x3, grossly nonfocal                         Data Review:     Telemetry independently reviewed :   paced  ECG independently reviewed: paced  Labs:   Recent Results (from the past 24 hour(s))   EKG, 12 LEAD, INITIAL    Collection Time: 20  1:55 PM   Result Value Ref Range    Ventricular Rate 40 BPM    Atrial Rate 41 BPM    QRS Duration 102 ms    Q-T Interval 484 ms    QTC Calculation (Bezet) 394 ms    Calculated R Axis 95 degrees    Calculated T Axis 43 degrees    Diagnosis       sinus rhythm  with third degree av block and idioventricular escaped rhythm  No previous ECGs available  Confirmed by Rowena Martinez MD, [facility] ([id number]) on 6/14/2020 3:40:36 PM     CBC WITH AUTOMATED DIFF    Collection Time: 06/14/20  1:58 PM   Result Value Ref Range    WBC 9.7 3.6 - 11.0 K/uL    RBC 3.71 (L) 3.80 - 5.20 M/uL    HGB 12.1 11.5 - 16.0 g/dL    HCT 36.5 35.0 - 47.0 %    MCV 98.4 80.0 - 99.0 FL    MCH 32.6 26.0 - 34.0 PG    MCHC 33.2 30.0 - 36.5 g/dL    RDW 13.4 11.5 - 14.5 %    PLATELET 829 125 - 128 K/uL    MPV 9.6 8.9 - 12.9 FL    NRBC 0.0 0  WBC    ABSOLUTE NRBC 0.00 0.00 - 0.01 K/uL    NEUTROPHILS 71 32 - 75 %    LYMPHOCYTES 19 12 - 49 %    MONOCYTES 6 5 - 13 %    EOSINOPHILS 3 0 - 7 %    BASOPHILS 1 0 - 1 %    IMMATURE GRANULOCYTES 0 0.0 - 0.5 %    ABS. NEUTROPHILS 6.9 1.8 - 8.0 K/UL    ABS. LYMPHOCYTES 1.9 0.8 - 3.5 K/UL    ABS. MONOCYTES 0.6 0.0 - 1.0 K/UL    ABS. EOSINOPHILS 0.3 0.0 - 0.4 K/UL    ABS. BASOPHILS 0.1 0.0 - 0.1 K/UL    ABS. IMM. GRANS. 0.0 0.00 - 0.04 K/UL    DF AUTOMATED     METABOLIC PANEL, COMPREHENSIVE    Collection Time: 06/14/20  1:58 PM   Result Value Ref Range    Sodium 133 (L) 136 - 145 mmol/L    Potassium 4.1 3.5 - 5.1 mmol/L    Chloride 102 97 - 108 mmol/L    CO2 24 21 - 32 mmol/L    Anion gap 7 5 - 15 mmol/L    Glucose 169 (H) 65 - 100 mg/dL    BUN 19 6 - 20 MG/DL    Creatinine 0.89 0.55 - 1.02 MG/DL    BUN/Creatinine ratio 21 (H) 12 - 20      GFR est AA >60 >60 ml/min/1.73m2    GFR est non-AA >60 >60 ml/min/1.73m2    Calcium 9.1 8.5 - 10.1 MG/DL    Bilirubin, total 0.3 0.2 - 1.0 MG/DL    ALT (SGPT) 15 12 - 78 U/L    AST (SGOT) 16 15 - 37 U/L    Alk.  phosphatase 84 45 - 117 U/L    Protein, total 8.2 6.4 - 8.2 g/dL    Albumin 3.1 (L) 3.5 - 5.0 g/dL    Globulin 5.1 (H) 2.0 - 4.0 g/dL    A-G Ratio 0.6 (L) 1.1 - 2.2     SAMPLES BEING HELD Collection Time: 06/14/20  1:58 PM   Result Value Ref Range    SAMPLES BEING HELD [id number]     COMMENT        Add-on orders for these samples will be processed based on acceptable specimen integrity and analyte stability, which may vary by analyte.    TSH 3RD GENERATION    Collection Time: 06/14/20  1:58 PM   Result Value Ref Range    TSH 0.55 0.36 - 3.74 uIU/mL   MAGNESIUM    Collection Time: 06/14/20  1:58 PM   Result Value Ref Range    Magnesium 1.9 1.6 - 2.4 mg/dL   TROPONIN I    Collection Time: 06/14/20  1:58 PM   Result Value Ref Range    Troponin-I, Qt. <0.05 <0.05 ng/mL   EKG, 12 LEAD, INITIAL    Collection Time: 06/14/20  4:06 PM   Result Value Ref Range    Ventricular Rate 37 BPM    Atrial Rate 38 BPM    QRS Duration 110 ms    Q-T Interval 502 ms    QTC Calculation (Bezet) 394 ms    Calculated R Axis 89 degrees    Calculated T Axis 47 degrees    Diagnosis       Junctional bradycardia  Incomplete right bundle branch block  Septal infarct (cited on or before 14-JUN-2020)  ST & T wave abnormality, consider anterior ischemia  When compared with ECG of 14-JUN-2020 13:55,  Incomplete right bundle branch block is now present     METABOLIC PANEL, BASIC    Collection Time: 06/15/20  4:23 AM   Result Value Ref Range    Sodium 136 136 - 145 mmol/L    Potassium 3.8 3.5 - 5.1 mmol/L    Chloride 102 97 - 108 mmol/L    CO2 27 21 - 32 mmol/L    Anion gap 7 5 - 15 mmol/L    Glucose 96 65 - 100 mg/dL    BUN 16 6 - 20 MG/DL    Creatinine 0.79 0.55 - 1.02 MG/DL    BUN/Creatinine ratio 20 12 - 20      GFR est AA >60 >60 ml/min/1.73m2    GFR est non-AA >60 >60 ml/min/1.73m2    Calcium 8.9 8.5 - 10.1 MG/DL      Current medications reviewed       Ganesh Moran MD

## 2020-06-15 NOTE — PROGRESS NOTES
Bedside shift change report given to Beatriz Sorensen RN (oncoming nurse) by Braden Cuellar RN (offgoing nurse). Report included the following information SBAR, Kardex, ED Summary, Intake/Output and MAR. SHIFT SUMMARY:  7936 patient back from cath lab. Had PPM placed. Hooked up to monitor. Initial Shift  Assessment performed           Mental Status: Oriented x4           Respiratory: RA           Cardiac: AV Paced           GI/: Continent/ Pure Wick             1240 received call from radiologist. Patient has an apical pnuemo. Paged Dr. Nohelia Tobar to make him aware. 1500 Pt resting quietly in bed. No new changes at this time. 1700 Pt has allergy to rocephin and asked to not take ancef if they are in the same class of medication. Confirmed with pharmacy they are and recommended reaching out to attending to switch to vancomycin. Page out to Karlie Allred. [phone] Spoke with Dr. Karlie Allred and orders to stop antibiotic at this time. Bedside shift change report given to Jadyn Quick RN (oncoming nurse) by Beatriz Sorensen RN (offgoing nurse). Report included the following information SBAR, Kardex, ED Summary, Intake/Output, MAR and Recent Results.

## 2020-06-15 NOTE — PROGRESS NOTES
Having r sided pleuritc chest pain      Used micropuncture for access and no air aspirated    My concern would be pneumothorax.     other poss would be cardiac perforation but used passive leads   Will obtain echo

## 2020-06-15 NOTE — PROCEDURES
Procedure:  Pacemaker implant  Right sided       Indication: Syptomatic bradycardia due to complete heart block with hr 30s. Dual chamber device is indicated for expected quality of life. PROCEDURES PERFORMED:  1. Conscious sedation. 2. Fluoroscopy for lead placement. 3. Permanent Pacemaker Implantation:      A: [employer]  DDDR Pacemaker Lyman [id number]      B: Placement of ventricular lead with threshold testing. Lead: 4074      C: Placement of atrial lead with threshold testing: Lead 2600    COMPLICATIONS:None. ESTIMATED BLOOD LOSS: Minimal  SPECIMENS: None  ASSISTANTS: See Eris    PROCEDURAL NOTE:  The patient was brought to the laboratory in a sedated postabsorptive state. The right chest wall was prepped and draped in the usual fashion and anesthetized with 20 mL of 2% lidocaine. Incision was made over the deltopectoral groove and extended to the level of the pectoralis fascia. A pocket was made anterior to the pectoralis fascia for in which to implant the device. The right cephalic vein was not accessible and the left axillary vein was cannulated by the Seldinger technique under fluorscopic guidance. A guide wire was advanced into the central circulation followed by a tear away sheath. Access was obtained with micropuncture needle. Initially a C315 sheath was used with a 315 lead to place in an lv septal position . This was not successful due to lead dislodgement or high thresholds. and was eventually abandoned. The ventricular lead was inserted and advanced to the right ventricular apex. Threshold testing was performed. Once adequate position was obtained the peel-away sheath was removed after a new J-wire was advanced using a retained wire technique with a 7-Faroese peel-away sheath. The atrial lead was inserted and advanced to the right atrial appendage. Threshold testing was performed.  Both leads were then secured to the pectoralis muscle utilizing its protective sleeve with #2-0 silk ties around the lead. The pacemaker pocket was flushed with antibiotic containing sterile saline  solution. The leads were then attached to generator. Leads and generator placed in the pocket with the leads posterior to the generator. Subcutaneous tissue closed in 2 layers utilizing #2-0 Vicryl. Skin closed with dermabond glue with an excellent final result. The patient was transferred to the holding area    No complications were noted.

## 2020-06-15 NOTE — PROGRESS NOTES
TRANSFER - IN REPORT:    Verbal report received from [address] [place] [address] [address], RN(name) on [place]  being received from CCU 22(unit) for ordered procedure  PPI with Dr. Stephen Azevedo    Report consisted of patients Situation, Background, Assessment and   Recommendations(SBAR). Information from the following report(s) SBAR, MAR, Recent Results and Cardiac Rhythm CHB 30's was reviewed with the receiving nurse. Opportunity for questions and clarification was provided. Assessment completed upon patients arrival to unit and care assumed.

## 2020-06-16 ENCOUNTER — APPOINTMENT (OUTPATIENT)
Dept: GENERAL RADIOLOGY | Age: 85
DRG: 244 | End: 2020-06-16
Attending: INTERNAL MEDICINE
Payer: MEDICARE

## 2020-06-16 ENCOUNTER — HOME HEALTH ADMISSION (OUTPATIENT)
Dept: HOME HEALTH SERVICES | Facility: HOME HEALTH | Age: 85
End: 2020-06-16
Payer: MEDICARE

## 2020-06-16 VITALS
WEIGHT: 147.49 LBS | DIASTOLIC BLOOD PRESSURE: 68 MMHG | BODY MASS INDEX: 24.57 KG/M2 | OXYGEN SATURATION: 97 % | TEMPERATURE: 98.3 F | SYSTOLIC BLOOD PRESSURE: 131 MMHG | RESPIRATION RATE: 22 BRPM | HEIGHT: 65 IN | HEART RATE: 103 BPM

## 2020-06-16 PROCEDURE — 97116 GAIT TRAINING THERAPY: CPT

## 2020-06-16 PROCEDURE — 71046 X-RAY EXAM CHEST 2 VIEWS: CPT

## 2020-06-16 PROCEDURE — 74011250637 HC RX REV CODE- 250/637: Performed by: INTERNAL MEDICINE

## 2020-06-16 PROCEDURE — 74011250636 HC RX REV CODE- 250/636: Performed by: INTERNAL MEDICINE

## 2020-06-16 PROCEDURE — 77030038269 HC DRN EXT URIN PURWCK BARD -A

## 2020-06-16 PROCEDURE — 97161 PT EVAL LOW COMPLEX 20 MIN: CPT

## 2020-06-16 RX ORDER — ONDANSETRON 2 MG/ML
4 INJECTION INTRAMUSCULAR; INTRAVENOUS ONCE
Status: COMPLETED | OUTPATIENT
Start: 2020-06-16 | End: 2020-06-16

## 2020-06-16 RX ADMIN — AMLODIPINE BESYLATE 10 MG: 5 TABLET ORAL at 08:19

## 2020-06-16 RX ADMIN — ASPIRIN 81 MG: 81 TABLET, COATED ORAL at 08:21

## 2020-06-16 RX ADMIN — METFORMIN HYDROCHLORIDE 500 MG: 500 TABLET ORAL at 08:19

## 2020-06-16 RX ADMIN — LOSARTAN POTASSIUM 100 MG: 50 TABLET, FILM COATED ORAL at 08:20

## 2020-06-16 RX ADMIN — CETIRIZINE HYDROCHLORIDE 10 MG: 10 TABLET, FILM COATED ORAL at 08:21

## 2020-06-16 RX ADMIN — ALLOPURINOL 300 MG: 100 TABLET ORAL at 08:19

## 2020-06-16 RX ADMIN — SODIUM CHLORIDE, SODIUM LACTATE, POTASSIUM CHLORIDE, AND CALCIUM CHLORIDE 500 ML: 600; 310; 30; 20 INJECTION, SOLUTION INTRAVENOUS at 12:43

## 2020-06-16 RX ADMIN — ONDANSETRON 4 MG: 2 INJECTION INTRAMUSCULAR; INTRAVENOUS at 12:41

## 2020-06-16 RX ADMIN — ACETAMINOPHEN 650 MG: 325 TABLET ORAL at 05:17

## 2020-06-16 RX ADMIN — HYDROCHLOROTHIAZIDE 12.5 MG: 25 TABLET ORAL at 08:20

## 2020-06-16 NOTE — PROGRESS NOTES
Problem: Mobility Impaired (Adult and Pediatric)  Goal: *Acute Goals and Plan of Care (Insert Text)  Description: FUNCTIONAL STATUS PRIOR TO ADMISSION: Patient was modified independent using a rolling walker and single point cane for functional mobility. HOME SUPPORT PRIOR TO ADMISSION: The patient lived alone with daughters in town to provide assistance. Physical Therapy Goals  Initiated 6/16/2020  1. Patient will move from supine to sit and sit to supine  in bed with modified independence within 7 day(s). 2.  Patient will transfer from bed to chair and chair to bed with modified independence using the least restrictive device within 7 day(s). 3.  Patient will perform sit to stand with modified independence within 7 day(s). 4.  Patient will ambulate with modified independence for 200 feet with the least restrictive device within 7 day(s). Outcome: Progressing Towards Goal  PHYSICAL THERAPY EVALUATION  Patient: Stevenson Layton (30 y.o. female)  Date: 6/16/2020  Primary Diagnosis: Heart block AV complete (HCC) [I44.2]  Procedure(s) (LRB):  INSERT PPM DUAL (N/A) 1 Day Post-Op   Precautions: fall         ASSESSMENT  Based on the objective data described below, the patient presents with impaired balance, decreased endurance, right shoulder pain, and signs/symptoms of orthostatic hypotension following admission for a complete heart block. She is now post pacemaker insertion and NWB on the right UE. At baseline she uses a combination of a RW and occasionally a cane d/t existing balance concerns. She has no hx of falls but lives alone and her family lives across [facility]. Gait training x120' during eval with left HHA and min-CGA for balance. Noted  during gait, a slow rashmi, and increased respiratory rate. Her SpO2 remained stable but her respiratory rate was up to 36 during gait. Concern for falls d/t her temporarily limited use of the right UE and lack of support in the home.  She is pending discharge today and recommend increased support in the home while she is immobilized in the sling, use of her cane, and HHPT follow up. Current Level of Function Impacting Discharge (mobility/balance): CGA - min for gait      Other factors to consider for discharge: live alone, right UE NWB, orthostatic hypotension     Patient will benefit from skilled therapy intervention to address the above noted impairments. PLAN :  Recommendations and Planned Interventions: bed mobility training, transfer training, gait training, therapeutic exercises, and neuromuscular re-education      Frequency/Duration: Patient will be followed by physical therapy:  5 times a week to address goals. Recommendation for discharge: (in order for the patient to meet his/her long term goals)  Physical therapy at least 2 days/week in the home AND ensure assist and/or supervision for safety with functional mobility     This discharge recommendation:  Has not yet been discussed the attending provider and/or case management    IF patient discharges home will need the following DME: patient owns DME required for discharge         SUBJECTIVE:   Patient stated I feel wobbly.     OBJECTIVE DATA SUMMARY:   HISTORY:    Past Medical History:   Diagnosis Date    Cancer of breast ([facility] [address]     Goiter     HTN (hypertension)      Past Surgical History:   Procedure Laterality Date    HX HYSTERECTOMY      MASTECTOMY FORM  1977    CO ENLARGE BREAST WITH IMPLANT      CO INS NEW/RPLCMT PRM PM W/TRANSV ELTRD ATRIAL&VENT N/A 6/15/2020    INSERT PPM DUAL performed by Anca Pillai MD at [address], [facility] [address] [facility]       Personal factors and/or comorbidities impacting plan of care:     Home Situation  Home Environment: Private residence  # Steps to Enter: (P) 3  Rails to Enter: (P) Yes  Wheelchair Ramp: (P) Yes  One/Two Story Residence: One story  Living Alone: Yes  Support Systems: None  Patient Expects to be Discharged to[de-identified] Private residence  Current DME Used/Available at Home: (P) Cane, straight, Walker, rolling, Grab bars    EXAMINATION/PRESENTATION/DECISION MAKING:   Critical Behavior:              Hearing: Auditory  Auditory Impairment: None  Skin:    Edema:   Range Of Motion:  AROM: Generally decreased, functional(impaired left knee extension in stance)                       Strength:    Strength: Generally decreased, functional                    Tone & Sensation:   Tone: Normal              Sensation: Intact               Coordination:  Coordination: Within functional limits  Vision:      Functional Mobility:  Bed Mobility:     Supine to Sit: Contact guard assistance;Minimum assistance     Scooting: Contact guard assistance; Additional time  Transfers:  Sit to Stand: Minimum assistance; Additional time  Stand to Sit: Minimum assistance                       Balance:   Sitting: Intact  Standing: Impaired  Standing - Static: Fair  Standing - Dynamic : Fair  Ambulation/Gait Training:  Distance (ft): 120 Feet (ft)  Assistive Device: Gait belt(left HHA, right sling)  Ambulation - Level of Assistance: Minimal assistance;Contact guard assistance     Gait Description (WDL): Exceptions to WDL  Gait Abnormalities: Decreased step clearance;Trunk sway increased        Base of Support: Widened     Speed/Jazmin: Slow             Physical Therapy Evaluation Charge Determination   History Examination Presentation Decision-Making   MEDIUM  Complexity : 1-2 comorbidities / personal factors will impact the outcome/ POC  MEDIUM Complexity : 3 Standardized tests and measures addressing body structure, function, activity limitation and / or participation in recreation  LOW Complexity : Stable, uncomplicated  LOW Complexity : FOTO score of       Based on the above components, the patient evaluation is determined to be of the following complexity level: LOW     Pain Rating:      Activity Tolerance:   Fair  Please refer to the flowsheet for vital signs taken during this treatment. Vitals:    06/16/20 1155 06/16/20 1158 06/16/20 1200 06/16/20 1201   BP: 114/75 (!) 88/43 107/43 107/43   BP 1 Location: Right arm Right arm Right arm Right arm   BP Patient Position: Sitting;Pre-activity Standing;During activity  Sitting;Post activity   Pulse:  66 63 61   Resp:  (!) 41 27 16   Temp:   98.3 °F (36.8 °C)    SpO2:  97% 98% 98%   Weight:       Height:             After treatment patient left in no apparent distress:   Sitting in chair and Call bell within reach    COMMUNICATION/EDUCATION:   The patients plan of care was discussed with: Registered nurse. Fall prevention education was provided and the patient/caregiver indicated understanding., Patient/family have participated as able in goal setting and plan of care. , and Patient/family agree to work toward stated goals and plan of care.     Thank you for this referral.  Jimbo Villa, PT, DPT   Time Calculation: 27 mins

## 2020-06-16 NOTE — PROGRESS NOTES
Hospital follow-up PCP transitional care appointment has been scheduled with Dr. Donzella Closs for Thursday, 6/25/20 at 9:00 a.m. Pending patient discharge.   Pauly Gutierrez, Care Management Specialist.

## 2020-06-16 NOTE — DISCHARGE SUMMARY
Cardiology Discharge Summary     Patient ID:  Michelle Ocampo  [id number]  19 y.o.  1/5/1933    Admit Date: 6/14/2020    Discharge Date: 6/16/2020     Admitting Physician: Edward Sellers MD     Discharge Physician: Edward Sellers MD    Admission Diagnoses: Heart block AV complete [facility]) [I44.2]    Discharge Diagnoses: Active Problems:    Heart block AV complete ([address] (6/14/2020)        Discharge Condition: Good    Cardiology Procedures this Admission:  Pacemaker placement    Hospital Course: Admitted with heart block. Monitored overnight in CCU. BB held but had no improvement so had dual chamber pacemaker placed by Dr. Jayant Umanzor. There was question of small PTx on CXR but not present on next day's CXR so was DC'ed    Consults: EP    Discharge Exam:     Visit Vitals  /59   Pulse 94   Temp 98.2 °F (36.8 °C)   Resp (!) 31   Ht 5' 5\" (1.651 m)   Wt 66.9 kg (147 lb 7.8 oz)   SpO2 96%   Breastfeeding No   BMI 24.54 kg/m²     General Appearance:  Well developed, well nourished, in no acute distress. Ears/Nose/Mouth/Throat:   Hearing grossly normal.         Neck: Supple. Chest:   Lungs clear to auscultation bilaterally. Normal resp effort. Cardiovascular:  Regular rate and rhythm, S1, S2 normal, no new murmur. Abdomen:   Soft, non-tender, bowel sounds are present. Extremities: No edema bilaterally. Neuro:  Skin: A/O x 3, grossly nonfocal. Normal mood/affect. Warm and dry. Disposition: home    Patient Instructions:   Current Discharge Medication List      CONTINUE these medications which have NOT CHANGED    Details   fluticasone propionate (Flonase Allergy Relief) 50 mcg/actuation nasal spray 2 Sprays by Both Nostrils route two (2) times daily as needed for Rhinitis. montelukast (Singulair) 10 mg tablet Take 10 mg by mouth nightly.       cetirizine (ZyrTEC) 10 mg tablet Take 10 mg by mouth.      metoprolol (LOPRESSOR) 50 mg tablet Take 50 mg by mouth three (3) times daily (with meals). 1 and 1/2 tabs     Associated Diagnoses: Nontoxic uninodular goiter; Thyroid nodule      CALCIUM CARBONATE/VITAMIN D3 (CALTRATE 600 + D PO) Take  by mouth two (2) times a day. Associated Diagnoses: Nontoxic uninodular goiter; Thyroid nodule      metformin (GLUCOPHAGE) 500 mg tablet Take 500 mg by mouth daily (with breakfast). Associated Diagnoses: Nontoxic uninodular goiter; Thyroid nodule      aspirin 81 mg tablet Take 81 mg by mouth. Associated Diagnoses: Nontoxic uninodular goiter; Thyroid nodule      omeprazole (PRILOSEC) 20 mg capsule Take 20 mg by mouth daily. Associated Diagnoses: Nontoxic uninodular goiter; Thyroid nodule      allopurinol (ZYLOPRIM) 300 mg tablet Take  by mouth daily. Associated Diagnoses: Nontoxic uninodular goiter; Thyroid nodule      olmesartan-hydrochlorothiazide (BENICAR HCT) 40-12.5 mg per tablet Take 1 Tab by mouth daily. Associated Diagnoses: Nontoxic uninodular goiter; Thyroid nodule      amlodipine (NORVASC) 10 mg tablet Take  by mouth daily. Associated Diagnoses: Nontoxic uninodular goiter; Thyroid nodule             Referenced discharge instructions provided by nursing for diet and activity.     Follow-up with Dr. Kirsten Underwood and pacemaker clinic to be scheduled     Signed:  Hilda Garcia MD DC took >30 minutes to perform

## 2020-06-16 NOTE — PROGRESS NOTES
1930: Bedside shift report received from [facility] 3073: scanned patients ppm and sent info with [employer] kaylee.  0730: Bedside and Verbal shift change report given to Kathleen Tavera RN (oncoming nurse) by Luis Mancera (offgoing nurse). Report included the following information SBAR, Kardex, Procedure Summary, Intake/Output, MAR, Accordion and Recent Results.

## 2020-06-16 NOTE — PROGRESS NOTES
Transition of Care Plan  RUR 9%    Disposition   Home with family support  Transportation  Daughter, Rosie [address]   [facility] [phone] to provide home PT  Medical follow up    PCP  Dr Deforest Gitelman       Reason for Admission:   Heart block   pacemaker placed 6/15/20                   RUR Score:    9% low                 Plan for utilizing home health:     Yes  [facility]     PCP: First and Last name:  Dr Clarisse Mccartney     Are you a current patient: Yes/No: yes   Approximate date of last visit:  Recently    Can you participate in a virtual visit with your PCP: yes                    Current Advanced Directive/Advance Care Plan:                          Transition of Care Plan:     Home today with home health and medical follow up          Cm met with patient in her room in CCU. She was alert and oriented. Confirmed demographics,  PCP and insurance- [address] and [employer]           Patient lives in one level  home alone with good support from her family. She has  A son and two daughters in the area who available to assist. They provide transportation to patient to appointments and daily activities. Patient was self care and independent prior to admission . Uses a RW and single point cane for mobility. Patient is discharging today- Home health PT ordered. CM talked with patient and daughter, Elvie Gonzales [phone] and both in agreement and chose [facility]. Referral sent to the agency and accepted. Freedom of choice letter signed and placed in chart    Elvie Gonzales is transporting home and will be staying with patient for the next 5 days. Care Management Interventions  PCP Verified by CM:  Yes  Mode of Transport at Discharge: (car)  Transition of Care Consult (CM Consult): [address]: Yes  Discharge Durable Medical Equipment: No  Physical Therapy Consult: Yes  Occupational Therapy Consult: Yes  Current Support Network: Own Home(lives alone in one level home   adult children close by   assist. no advance medical directive  )  Confirm Follow Up Transport: Family  The Plan for Transition of Care is Related to the Following Treatment Goals : home health   The Patient and/or Patient Representative was Provided with a Choice of Provider and Agrees with the Discharge Plan?: Yes(daughter   Yola Collins )  Name of the Patient Representative Who was Provided with a Choice of Provider and Agrees with the Discharge Plan: home with home health  Freedom of Choice List was Provided with Basic Dialogue that Supports the Patient's Individualized Plan of Care/Goals, Treatment Preferences and Shares the Quality Data Associated with the Providers?: Yes  Discharge Location  Discharge Placement: Home with home health

## 2020-06-16 NOTE — PROGRESS NOTES
Bedside shift change report given to Obdulia Us RN (oncoming nurse) by Andrey Sanders RN (offgoing nurse). Report included the following information SBAR, Kardex, ED Summary, Intake/Output, MAR, Accordion and Recent Results. SHIFT SUMMARY:    0800 Initial Shift  Assessment performed           Mental Status: Oriented x4           Respiratory: RA           Cardiac: Paced           GI/: Continent/ BSC             6452 Transport here to take patient for xray. Spoke with Dr. Ching Bonilla and pending discharge post xray results. 1030 Pt back from xray. Hooked up to monitor. 1120 Spoke with attending. Xray has resulted. Orders for discharge shortly. 1200 PT at bedside and noted patient became orthostatic with walking. Did recover after sitting down. Recommending Home health. Will talk with case management. 1220 Patient complaining of nausea. Stated her food made her nauseous. BP also soft. Spoke with MD and orders for 500cc LR bolus and zofran. 1300 Pt in bed. Stated her nausea is gone and feels much better. Will check BP once bolus is complete. 1345 Reviewed discharge instructions with patient and spoke to daughter concerning limitations. Time was given for questions and clarification. Will assist patient in getting dressed and remove IV.   1415 Patient taken out to daughter via wheelchair.

## 2020-06-16 NOTE — DISCHARGE INSTRUCTIONS
Patient Education        Learning About a Pacemaker  What is a pacemaker? A pacemaker is a small device. It sends out mild electrical signals that keep your heart beating normally. The signals are painless. It can help stop the dizziness, fainting, and shortness of breath caused by a slow or unsteady heartbeat. A pacemaker is powered by batteries. Most pacemakers are placed under the skin of your chest. They have thin wires, called leads. The leads pass through a vein into your heart. A pacemaker can help restore a normal heart rate. It is used when certain problems have damaged the heart's electrical system, which normally keeps your heart beating steadily. You may feel worried about having a pacemaker. This is common. It can help if you learn about how the pacemaker helps your heart. Talk to your doctor about your concerns. How is a pacemaker implanted in your chest?  You will get medicine before the procedure. It helps you relax and helps prevent pain. The doctor makes a cut in the skin just below your collarbone. The cut may be on either side of your chest. The doctor will put the pacemaker leads through the cut. The leads go into a large blood vessel in the upper chest. Then the doctor will guide the leads through the blood vessel into the heart. The leads are placed in one or two of the chambers in the heart. The doctor will place the pacemaker under the skin of your chest. He or she will attach the leads to the pacemaker. Then the cut will be closed with stitches. The procedure usually takes about an hour. You may need to spend the night in the hospital.  What can you expect when you have a pacemaker? A pacemaker can help you return to a more normal, more active life. You'll need to use certain electric devices with caution. Some devices have a strong electromagnetic field. This field can keep your pacemaker from working right for a short time.  These devices include things in your home, garage, or workplace. Check with your doctor about what you need to avoid and what you need to keep a short distance away from your pacemaker. Many household and office electronics do not affect your pacemaker. Your doctor will check your pacemaker regularly to make sure it is working right. Pacemaker batteries usually last 5 to 15 years before they need to be replaced. Follow-up care is a key part of your treatment and safety. Be sure to make and go to all appointments, and call your doctor if you are having problems. It's also a good idea to know your test results and keep a list of the medicines you take. Where can you learn more? Go to http://lisa-dora.info/  Enter Q261 in the search box to learn more about \"Learning About a Pacemaker. \"  Current as of: December 16, 2019               Content Version: 12.5  © 9163-1369 [employer]. Care instructions adapted under license by [employer] (which disclaims liability or warranty for this information). If you have questions about a medical condition or this instruction, always ask your healthcare professional. [place] [address] any warranty or liability for your use of this information. Patient Education        Pacemaker Placement: What to Expect at Home  Your Recovery    Pacemaker placement is surgery to put a pacemaker in your chest. This surgery may be done if you have bradycardia (a slow heart rate). A pacemaker is a small, battery-powered device. It sends electrical signals to the heart. These signals work to keep the heartbeat steady. Thin wires, called leads, carry the signals from the pacemaker to the heart. A pacemaker can prevent or reduce dizziness, fainting, and shortness of breath caused by a slow or unsteady heartbeat. Your chest may be sore where the doctor made the cut (incision) and put in the pacemaker. You also may have a bruise and mild swelling.  These symptoms usually get better in 1 to 2 weeks. You may feel a hard ridge along the incision. This usually gets softer in the months after surgery. You may be able to see or feel the outline of the pacemaker under your skin. You will probably be able to go back to work or your usual routine 1 to 2 weeks after surgery. Pacemaker batteries usually last 5 to 15 years. Your doctor will talk to you about how often you will need to have your pacemaker checked. You'll need to take steps to safely use electric devices. Some of these devices can stop your pacemaker from working right for a short time. Check with your doctor about what to avoid and what to keep a short distance away from your pacemaker. For example, you will need to stay away from things with strong magnetic and electrical fields. An example is an MRI machine (unless your pacemaker is safe for an MRI). You can use a cell phone and other wireless devices but keep them at least 6 inches away from your pacemaker. Many household and office electronics do not affect a pacemaker. These include kitchen appliances and computers. This care sheet gives you a general idea about how long it will take for you to recover. But each person recovers at a different pace. Follow the steps below to get better as quickly as possible. How can you care for yourself at home? Activity  · Rest when you feel tired. · Be active. Walking is a good choice. · For 4 to 6 weeks:  ? Avoid activities that strain your chest or upper arm muscles. This includes pushing a  or vacuum, or mopping floors. It also includes swimming, or swinging a golf club or tennis racquet. ? Do not raise your arm (the one on the side of your body where the pacemaker is located) above your shoulder. ? Allow your body to heal. Don't move quickly or lift anything heavy until you are feeling better. · Many people are able to return to work within 1 to 2 weeks after surgery.   · Ask your doctor when it is okay for you to have sex.  Diet  · You can eat your normal diet. If your stomach is upset, try bland, low-fat foods like plain rice, broiled chicken, toast, and yogurt. Medicines  · Your doctor will tell you if and when you can restart your medicines. He or she will also give you instructions about taking any new medicines. · If you take aspirin or some other blood thinner, be sure to talk to your doctor. He or she will tell you if and when to start taking this medicine again. Make sure that you understand exactly what your doctor wants you to do. · Be safe with medicines. Read and follow all instructions on the label. ? If the doctor gave you a prescription medicine for pain, take it as prescribed. ? If you are not taking a prescription pain medicine, ask your doctor if you can take an over-the-counter medicine. ? Do not take aspirin, ibuprofen (Advil, Motrin), naproxen (Aleve), or other nonsteroidal anti-inflammatory drugs (NSAIDs) unless your doctor says it is okay. · If your doctor prescribed antibiotics, take them as directed. Do not stop taking them just because you feel better. You need to take the full course of antibiotics. Incision care  · If you have strips of tape on the incision, leave the tape on for a week or until it falls off. · Keep the incision dry while it heals. Your doctor may recommend sponge baths for about 7 days, but do not get the incision wet. Your doctor will let you know when you may take showers. After a shower, pat the incision dry. · Don't use hydrogen peroxide or alcohol on the incision, which can slow healing. You may cover the area with a gauze bandage if it oozes fluid or rubs against clothing. Change the bandage every day. · Do not take a bath or get into a hot tub for the first 2 weeks, or until your doctor tells you it is okay. Other instructions  · Keep a medical ID card with you at all times that says you have a pacemaker.  The card should include the  and model information. · Wear medical alert jewelry stating that you have a pacemaker. You can buy this at most drugstores. · Check your pulse as directed by your doctor. · Have your pacemaker checked as often as your doctor recommends. In some cases, this may be done over the phone or the Internet. Your doctor will give you instructions about how to do this. Follow-up care is a key part of your treatment and safety. Be sure to make and go to all appointments, and call your doctor if you are having problems. It's also a good idea to know your test results and keep a list of the medicines you take. When should you call for help? [id number] anytime you think you may need emergency care. For example, call if:  · You passed out (lost consciousness). · You have trouble breathing. Call your doctor now or seek immediate medical care if:  · You are dizzy or light-headed, or you feel like you may faint. · You have pain that does not get better after you take pain medicine. · You hear an alarm or feel a vibration from your pacemaker. · You have loose stitches, or your incision comes open. · Bright red blood has soaked through the bandage over your incision. · You have signs of infection, such as:  ? Increased pain, swelling, warmth, or redness. ? Red streaks leading from the incision. ? Pus draining from the incision. ? A fever. Watch closely for changes in your health, and be sure to contact your doctor if:  · You have any problems with your pacemaker. Where can you learn more? Go to http://lisa-dora.info/  Enter G550 in the search box to learn more about \"Pacemaker Placement: What to Expect at Home. \"  Current as of: December 16, 2019               Content Version: 12.5  © 2574-0519 [employer]. Care instructions adapted under license by [employer] (which disclaims liability or warranty for this information).  If you have questions about a medical condition or this instruction, always ask your healthcare professional. [address]  any warranty or liability for your use of this information. There was a small irregularity on your chest XR. I would alert your pulmonologist to it when you see him next so that it can be followed.     You will be contacted with follow up appointment for the pacemaker clinic and with  [facility]

## 2020-06-17 ENCOUNTER — HOME CARE VISIT (OUTPATIENT)
Dept: SCHEDULING | Facility: HOME HEALTH | Age: 85
End: 2020-06-17
Payer: MEDICARE

## 2020-06-17 ENCOUNTER — PATIENT OUTREACH (OUTPATIENT)
Dept: FAMILY MEDICINE CLINIC | Age: 85
End: 2020-06-17

## 2020-06-17 VITALS
SYSTOLIC BLOOD PRESSURE: 140 MMHG | OXYGEN SATURATION: 97 % | RESPIRATION RATE: 18 BRPM | TEMPERATURE: 97.9 F | HEART RATE: 74 BPM | DIASTOLIC BLOOD PRESSURE: 70 MMHG

## 2020-06-17 PROCEDURE — 3331090002 HH PPS REVENUE DEBIT

## 2020-06-17 PROCEDURE — G0151 HHCP-SERV OF PT,EA 15 MIN: HCPCS

## 2020-06-17 PROCEDURE — 400013 HH SOC

## 2020-06-17 PROCEDURE — 3331090001 HH PPS REVENUE CREDIT

## 2020-06-18 ENCOUNTER — HOME CARE VISIT (OUTPATIENT)
Dept: SCHEDULING | Facility: HOME HEALTH | Age: 85
End: 2020-06-18
Payer: MEDICARE

## 2020-06-18 VITALS
TEMPERATURE: 98.1 F | RESPIRATION RATE: 17 BRPM | OXYGEN SATURATION: 98 % | SYSTOLIC BLOOD PRESSURE: 126 MMHG | DIASTOLIC BLOOD PRESSURE: 62 MMHG | HEART RATE: 62 BPM

## 2020-06-18 PROCEDURE — G0157 HHC PT ASSISTANT EA 15: HCPCS

## 2020-06-18 PROCEDURE — 3331090002 HH PPS REVENUE DEBIT

## 2020-06-18 PROCEDURE — 3331090001 HH PPS REVENUE CREDIT

## 2020-06-19 PROCEDURE — 3331090002 HH PPS REVENUE DEBIT

## 2020-06-19 PROCEDURE — 3331090001 HH PPS REVENUE CREDIT

## 2020-06-20 PROCEDURE — 3331090001 HH PPS REVENUE CREDIT

## 2020-06-20 PROCEDURE — 3331090002 HH PPS REVENUE DEBIT

## 2020-06-21 PROCEDURE — 3331090002 HH PPS REVENUE DEBIT

## 2020-06-21 PROCEDURE — 3331090001 HH PPS REVENUE CREDIT

## 2020-06-22 PROCEDURE — 3331090002 HH PPS REVENUE DEBIT

## 2020-06-22 PROCEDURE — 3331090001 HH PPS REVENUE CREDIT

## 2020-06-23 ENCOUNTER — HOME CARE VISIT (OUTPATIENT)
Dept: SCHEDULING | Facility: HOME HEALTH | Age: 85
End: 2020-06-23
Payer: MEDICARE

## 2020-06-23 PROCEDURE — 3331090002 HH PPS REVENUE DEBIT

## 2020-06-23 PROCEDURE — 3331090001 HH PPS REVENUE CREDIT

## 2020-06-24 PROCEDURE — 3331090002 HH PPS REVENUE DEBIT

## 2020-06-24 PROCEDURE — 3331090001 HH PPS REVENUE CREDIT

## 2020-06-25 PROCEDURE — 3331090002 HH PPS REVENUE DEBIT

## 2020-06-25 PROCEDURE — 3331090001 HH PPS REVENUE CREDIT

## 2020-06-26 ENCOUNTER — HOME CARE VISIT (OUTPATIENT)
Dept: SCHEDULING | Facility: HOME HEALTH | Age: 85
End: 2020-06-26
Payer: MEDICARE

## 2020-06-26 VITALS — SYSTOLIC BLOOD PRESSURE: 138 MMHG | DIASTOLIC BLOOD PRESSURE: 58 MMHG | TEMPERATURE: 98.9 F

## 2020-06-26 PROCEDURE — 3331090002 HH PPS REVENUE DEBIT

## 2020-06-26 PROCEDURE — 3331090001 HH PPS REVENUE CREDIT

## 2020-06-26 PROCEDURE — G0157 HHC PT ASSISTANT EA 15: HCPCS

## 2020-06-27 PROCEDURE — 3331090002 HH PPS REVENUE DEBIT

## 2020-06-27 PROCEDURE — 3331090001 HH PPS REVENUE CREDIT

## 2020-06-28 PROCEDURE — 3331090001 HH PPS REVENUE CREDIT

## 2020-06-28 PROCEDURE — 3331090002 HH PPS REVENUE DEBIT

## 2020-06-29 PROCEDURE — 3331090002 HH PPS REVENUE DEBIT

## 2020-06-29 PROCEDURE — 3331090001 HH PPS REVENUE CREDIT

## 2020-06-30 PROCEDURE — 3331090001 HH PPS REVENUE CREDIT

## 2020-06-30 PROCEDURE — 3331090002 HH PPS REVENUE DEBIT

## 2020-07-01 ENCOUNTER — HOME CARE VISIT (OUTPATIENT)
Dept: HOME HEALTH SERVICES | Facility: HOME HEALTH | Age: 85
End: 2020-07-01
Payer: MEDICARE

## 2020-07-01 ENCOUNTER — HOME CARE VISIT (OUTPATIENT)
Dept: SCHEDULING | Facility: HOME HEALTH | Age: 85
End: 2020-07-01
Payer: MEDICARE

## 2020-07-01 VITALS
OXYGEN SATURATION: 97 % | TEMPERATURE: 97.6 F | HEART RATE: 76 BPM | SYSTOLIC BLOOD PRESSURE: 124 MMHG | RESPIRATION RATE: 17 BRPM | DIASTOLIC BLOOD PRESSURE: 80 MMHG

## 2020-07-01 PROCEDURE — G0157 HHC PT ASSISTANT EA 15: HCPCS

## 2020-07-01 PROCEDURE — 3331090002 HH PPS REVENUE DEBIT

## 2020-07-01 PROCEDURE — 3331090001 HH PPS REVENUE CREDIT

## 2020-07-02 PROCEDURE — 3331090001 HH PPS REVENUE CREDIT

## 2020-07-02 PROCEDURE — 3331090002 HH PPS REVENUE DEBIT

## 2020-07-03 ENCOUNTER — HOME CARE VISIT (OUTPATIENT)
Dept: SCHEDULING | Facility: HOME HEALTH | Age: 85
End: 2020-07-03
Payer: MEDICARE

## 2020-07-03 VITALS
OXYGEN SATURATION: 98 % | TEMPERATURE: 98.1 F | DIASTOLIC BLOOD PRESSURE: 58 MMHG | HEART RATE: 62 BPM | SYSTOLIC BLOOD PRESSURE: 128 MMHG

## 2020-07-03 PROCEDURE — G0157 HHC PT ASSISTANT EA 15: HCPCS

## 2020-07-03 PROCEDURE — 3331090002 HH PPS REVENUE DEBIT

## 2020-07-03 PROCEDURE — 3331090001 HH PPS REVENUE CREDIT

## 2020-07-04 ENCOUNTER — PATIENT OUTREACH (OUTPATIENT)
Dept: FAMILY MEDICINE CLINIC | Age: 85
End: 2020-07-04

## 2020-07-04 PROCEDURE — 3331090001 HH PPS REVENUE CREDIT

## 2020-07-04 PROCEDURE — 3331090002 HH PPS REVENUE DEBIT

## 2020-07-05 PROCEDURE — 3331090001 HH PPS REVENUE CREDIT

## 2020-07-05 PROCEDURE — 3331090002 HH PPS REVENUE DEBIT

## 2020-07-06 PROCEDURE — 3331090002 HH PPS REVENUE DEBIT

## 2020-07-06 PROCEDURE — 3331090001 HH PPS REVENUE CREDIT

## 2020-07-07 ENCOUNTER — HOME CARE VISIT (OUTPATIENT)
Dept: SCHEDULING | Facility: HOME HEALTH | Age: 85
End: 2020-07-07
Payer: MEDICARE

## 2020-07-07 ENCOUNTER — HOME CARE VISIT (OUTPATIENT)
Dept: HOME HEALTH SERVICES | Facility: HOME HEALTH | Age: 85
End: 2020-07-07
Payer: MEDICARE

## 2020-07-07 VITALS
DIASTOLIC BLOOD PRESSURE: 50 MMHG | HEART RATE: 64 BPM | OXYGEN SATURATION: 98 % | RESPIRATION RATE: 18 BRPM | SYSTOLIC BLOOD PRESSURE: 120 MMHG | TEMPERATURE: 97.9 F

## 2020-07-07 PROCEDURE — 3331090001 HH PPS REVENUE CREDIT

## 2020-07-07 PROCEDURE — G0157 HHC PT ASSISTANT EA 15: HCPCS

## 2020-07-07 PROCEDURE — 3331090002 HH PPS REVENUE DEBIT

## 2020-07-08 PROCEDURE — 3331090001 HH PPS REVENUE CREDIT

## 2020-07-08 PROCEDURE — 3331090002 HH PPS REVENUE DEBIT

## 2020-07-09 PROCEDURE — 3331090001 HH PPS REVENUE CREDIT

## 2020-07-09 PROCEDURE — 3331090002 HH PPS REVENUE DEBIT

## 2020-07-10 ENCOUNTER — HOME CARE VISIT (OUTPATIENT)
Dept: SCHEDULING | Facility: HOME HEALTH | Age: 85
End: 2020-07-10
Payer: MEDICARE

## 2020-07-10 VITALS
TEMPERATURE: 98.2 F | DIASTOLIC BLOOD PRESSURE: 60 MMHG | SYSTOLIC BLOOD PRESSURE: 123 MMHG | HEART RATE: 71 BPM | RESPIRATION RATE: 17 BRPM | OXYGEN SATURATION: 97 %

## 2020-07-10 PROCEDURE — G0157 HHC PT ASSISTANT EA 15: HCPCS

## 2020-07-10 PROCEDURE — 3331090001 HH PPS REVENUE CREDIT

## 2020-07-10 PROCEDURE — 3331090002 HH PPS REVENUE DEBIT

## 2020-07-11 PROCEDURE — 3331090001 HH PPS REVENUE CREDIT

## 2020-07-11 PROCEDURE — 3331090002 HH PPS REVENUE DEBIT

## 2020-07-12 PROCEDURE — 3331090001 HH PPS REVENUE CREDIT

## 2020-07-12 PROCEDURE — 3331090002 HH PPS REVENUE DEBIT

## 2020-07-13 ENCOUNTER — HOME CARE VISIT (OUTPATIENT)
Dept: SCHEDULING | Facility: HOME HEALTH | Age: 85
End: 2020-07-13
Payer: MEDICARE

## 2020-07-13 VITALS
TEMPERATURE: 98.3 F | SYSTOLIC BLOOD PRESSURE: 138 MMHG | DIASTOLIC BLOOD PRESSURE: 65 MMHG | OXYGEN SATURATION: 98 % | HEART RATE: 61 BPM

## 2020-07-13 PROCEDURE — 3331090002 HH PPS REVENUE DEBIT

## 2020-07-13 PROCEDURE — 3331090001 HH PPS REVENUE CREDIT

## 2020-07-13 PROCEDURE — G0157 HHC PT ASSISTANT EA 15: HCPCS

## 2020-07-14 PROCEDURE — 3331090002 HH PPS REVENUE DEBIT

## 2020-07-14 PROCEDURE — 3331090001 HH PPS REVENUE CREDIT

## 2020-07-15 PROCEDURE — 3331090001 HH PPS REVENUE CREDIT

## 2020-07-15 PROCEDURE — 3331090002 HH PPS REVENUE DEBIT

## 2020-07-16 PROCEDURE — 3331090001 HH PPS REVENUE CREDIT

## 2020-07-16 PROCEDURE — 3331090002 HH PPS REVENUE DEBIT

## 2020-07-17 ENCOUNTER — HOME CARE VISIT (OUTPATIENT)
Dept: HOME HEALTH SERVICES | Facility: HOME HEALTH | Age: 85
End: 2020-07-17
Payer: MEDICARE

## 2020-07-17 ENCOUNTER — HOME CARE VISIT (OUTPATIENT)
Dept: SCHEDULING | Facility: HOME HEALTH | Age: 85
End: 2020-07-17
Payer: MEDICARE

## 2020-07-17 PROCEDURE — 3331090001 HH PPS REVENUE CREDIT

## 2020-07-17 PROCEDURE — G0151 HHCP-SERV OF PT,EA 15 MIN: HCPCS

## 2020-07-17 PROCEDURE — 400013 HH SOC

## 2020-07-17 PROCEDURE — 3331090002 HH PPS REVENUE DEBIT

## 2020-07-18 PROCEDURE — 3331090002 HH PPS REVENUE DEBIT

## 2020-07-18 PROCEDURE — 3331090001 HH PPS REVENUE CREDIT

## 2020-07-19 PROCEDURE — 3331090002 HH PPS REVENUE DEBIT

## 2020-07-19 PROCEDURE — 3331090001 HH PPS REVENUE CREDIT

## 2020-07-20 ENCOUNTER — HOME CARE VISIT (OUTPATIENT)
Dept: SCHEDULING | Facility: HOME HEALTH | Age: 85
End: 2020-07-20
Payer: MEDICARE

## 2020-07-20 VITALS
OXYGEN SATURATION: 98 % | HEART RATE: 63 BPM | DIASTOLIC BLOOD PRESSURE: 58 MMHG | TEMPERATURE: 97.9 F | SYSTOLIC BLOOD PRESSURE: 126 MMHG

## 2020-07-20 PROCEDURE — 3331090002 HH PPS REVENUE DEBIT

## 2020-07-20 PROCEDURE — G0157 HHC PT ASSISTANT EA 15: HCPCS

## 2020-07-20 PROCEDURE — 3331090001 HH PPS REVENUE CREDIT

## 2020-07-21 PROCEDURE — 3331090002 HH PPS REVENUE DEBIT

## 2020-07-21 PROCEDURE — 3331090001 HH PPS REVENUE CREDIT

## 2020-07-22 PROCEDURE — 3331090001 HH PPS REVENUE CREDIT

## 2020-07-22 PROCEDURE — 3331090002 HH PPS REVENUE DEBIT

## 2020-07-23 ENCOUNTER — HOME CARE VISIT (OUTPATIENT)
Dept: SCHEDULING | Facility: HOME HEALTH | Age: 85
End: 2020-07-23
Payer: MEDICARE

## 2020-07-23 PROCEDURE — G0157 HHC PT ASSISTANT EA 15: HCPCS

## 2020-07-23 PROCEDURE — 3331090001 HH PPS REVENUE CREDIT

## 2020-07-23 PROCEDURE — 3331090002 HH PPS REVENUE DEBIT

## 2020-07-24 PROCEDURE — 3331090002 HH PPS REVENUE DEBIT

## 2020-07-24 PROCEDURE — 3331090001 HH PPS REVENUE CREDIT

## 2020-07-25 PROCEDURE — 3331090001 HH PPS REVENUE CREDIT

## 2020-07-25 PROCEDURE — 3331090002 HH PPS REVENUE DEBIT

## 2020-07-26 PROCEDURE — 3331090002 HH PPS REVENUE DEBIT

## 2020-07-26 PROCEDURE — 3331090001 HH PPS REVENUE CREDIT

## 2020-07-27 PROCEDURE — 3331090002 HH PPS REVENUE DEBIT

## 2020-07-27 PROCEDURE — 3331090001 HH PPS REVENUE CREDIT

## 2020-07-28 ENCOUNTER — HOME CARE VISIT (OUTPATIENT)
Dept: SCHEDULING | Facility: HOME HEALTH | Age: 85
End: 2020-07-28
Payer: MEDICARE

## 2020-07-28 PROCEDURE — G0157 HHC PT ASSISTANT EA 15: HCPCS

## 2020-07-28 PROCEDURE — 3331090002 HH PPS REVENUE DEBIT

## 2020-07-28 PROCEDURE — 3331090001 HH PPS REVENUE CREDIT

## 2020-07-29 PROCEDURE — 3331090001 HH PPS REVENUE CREDIT

## 2020-07-29 PROCEDURE — 3331090002 HH PPS REVENUE DEBIT

## 2020-07-30 ENCOUNTER — HOME CARE VISIT (OUTPATIENT)
Dept: HOME HEALTH SERVICES | Facility: HOME HEALTH | Age: 85
End: 2020-07-30
Payer: MEDICARE

## 2020-07-30 ENCOUNTER — HOME CARE VISIT (OUTPATIENT)
Dept: SCHEDULING | Facility: HOME HEALTH | Age: 85
End: 2020-07-30
Payer: MEDICARE

## 2020-07-30 VITALS
SYSTOLIC BLOOD PRESSURE: 140 MMHG | OXYGEN SATURATION: 97 % | HEART RATE: 61 BPM | DIASTOLIC BLOOD PRESSURE: 60 MMHG | TEMPERATURE: 97.9 F | RESPIRATION RATE: 18 BRPM

## 2020-07-30 PROCEDURE — 3331090001 HH PPS REVENUE CREDIT

## 2020-07-30 PROCEDURE — G0151 HHCP-SERV OF PT,EA 15 MIN: HCPCS

## 2020-07-30 PROCEDURE — 3331090002 HH PPS REVENUE DEBIT

## (undated) DEVICE — CATH PACING SYS MDT 502 -- RIGHTSITE

## (undated) DEVICE — DRSG AQUACEL SURG 3.5X6IN -- CONVERT TO ITEM 369227

## (undated) DEVICE — LEAD PACING BPLR ATRIAL/VENTR --
Type: IMPLANTABLE DEVICE | Status: NON-FUNCTIONAL
Removed: 2020-06-15

## (undated) DEVICE — INTRO SHTH 7FR 13X20CM -- TEARAWAY

## (undated) DEVICE — LIMB HOLDER, WRIST/ANKLE: Brand: DEROYAL

## (undated) DEVICE — REM POLYHESIVE ADULT PATIENT RETURN ELECTRODE: Brand: VALLEYLAB

## (undated) DEVICE — PACEMAKER PACK: Brand: MEDLINE INDUSTRIES, INC.

## (undated) DEVICE — SYRINGE IRRIG 60ML SFT PLIABLE BLB EZ TO GRP 1 HND USE W/

## (undated) DEVICE — SLING ORTHOPEDIC PCH UNIV 19.5X9 IN 2-39 IN ARM W/ FOAM STRP

## (undated) DEVICE — DERMABOND SKIN ADH 0.7ML -- DERMABOND ADVANCED 12/BX

## (undated) DEVICE — Device

## (undated) DEVICE — INTRO SHTH 9FR 13X20CM -- USE ITEM# 341577

## (undated) DEVICE — Device: Brand: PADPRO

## (undated) DEVICE — SUTURE PERMAHAND SZ 0 L18IN NONABSORBABLE BLK SILK BRAID A186H

## (undated) DEVICE — 3M™ IOBAN™ 2 ANTIMICROBIAL INCISE DRAPE 6650EZ: Brand: IOBAN™ 2

## (undated) DEVICE — MICROPUNCTURE INTRODUCER SET SILHOUETTE TRANSITIONLESS WITH STAINLESS STEEL WIRE GUIDE: Brand: MICROPUNCTURE

## (undated) DEVICE — GOWN,SIRUS,NONRNF,SETINSLV,2XL,18/CS: Brand: MEDLINE

## (undated) DEVICE — TELFA NON-ADHERENT PADS PREPAK: Brand: TELFA

## (undated) DEVICE — 3M™ TEGADERM™ TRANSPARENT FILM DRESSING FRAME STYLE, 1626W, 4 IN X 4-3/4 IN (10 CM X 12 CM), 50/CT 4CT/CASE: Brand: 3M™ TEGADERM™

## (undated) DEVICE — GUIDEWIRE VASC L80CM DIA0.018IN TIP L5CM 15DEG ANG NIT